# Patient Record
Sex: FEMALE | Race: WHITE | NOT HISPANIC OR LATINO | Employment: FULL TIME | URBAN - METROPOLITAN AREA
[De-identification: names, ages, dates, MRNs, and addresses within clinical notes are randomized per-mention and may not be internally consistent; named-entity substitution may affect disease eponyms.]

---

## 2021-09-16 ENCOUNTER — HOSPITAL ENCOUNTER (OUTPATIENT)
Facility: MEDICAL CENTER | Age: 31
End: 2021-09-16
Attending: OBSTETRICS & GYNECOLOGY
Payer: COMMERCIAL

## 2021-09-16 PROCEDURE — 87591 N.GONORRHOEAE DNA AMP PROB: CPT

## 2021-09-16 PROCEDURE — 87491 CHLMYD TRACH DNA AMP PROBE: CPT

## 2021-09-17 LAB
C TRACH DNA SPEC QL NAA+PROBE: NEGATIVE
N GONORRHOEA DNA SPEC QL NAA+PROBE: NEGATIVE
SPECIMEN SOURCE: NORMAL

## 2021-09-29 ENCOUNTER — HOSPITAL ENCOUNTER (OUTPATIENT)
Dept: LAB | Facility: MEDICAL CENTER | Age: 31
End: 2021-09-29
Attending: OBSTETRICS & GYNECOLOGY

## 2021-09-29 ENCOUNTER — HOSPITAL ENCOUNTER (OUTPATIENT)
Facility: MEDICAL CENTER | Age: 31
End: 2021-09-29
Attending: OBSTETRICS & GYNECOLOGY
Payer: COMMERCIAL

## 2021-09-29 LAB
ABO GROUP BLD: NORMAL
BASOPHILS # BLD AUTO: 0.3 % (ref 0–1.8)
BASOPHILS # BLD: 0.02 K/UL (ref 0–0.12)
BLD GP AB SCN SERPL QL: NORMAL
EOSINOPHIL # BLD AUTO: 0.05 K/UL (ref 0–0.51)
EOSINOPHIL NFR BLD: 0.7 % (ref 0–6.9)
ERYTHROCYTE [DISTWIDTH] IN BLOOD BY AUTOMATED COUNT: 43 FL (ref 35.9–50)
HBV SURFACE AG SER QL: NORMAL
HCT VFR BLD AUTO: 43.2 % (ref 37–47)
HCV AB SER QL: NORMAL
HGB BLD-MCNC: 15.6 G/DL (ref 12–16)
HIV 1+2 AB+HIV1 P24 AG SERPL QL IA: NORMAL
IMM GRANULOCYTES # BLD AUTO: 0.02 K/UL (ref 0–0.11)
IMM GRANULOCYTES NFR BLD AUTO: 0.3 % (ref 0–0.9)
LYMPHOCYTES # BLD AUTO: 1.81 K/UL (ref 1–4.8)
LYMPHOCYTES NFR BLD: 24.6 % (ref 22–41)
MCH RBC QN AUTO: 35.3 PG (ref 27–33)
MCHC RBC AUTO-ENTMCNC: 36.1 G/DL (ref 33.6–35)
MCV RBC AUTO: 97.7 FL (ref 81.4–97.8)
MONOCYTES # BLD AUTO: 0.54 K/UL (ref 0–0.85)
MONOCYTES NFR BLD AUTO: 7.3 % (ref 0–13.4)
NEUTROPHILS # BLD AUTO: 4.92 K/UL (ref 2–7.15)
NEUTROPHILS NFR BLD: 66.8 % (ref 44–72)
NRBC # BLD AUTO: 0 K/UL
NRBC BLD-RTO: 0 /100 WBC
PLATELET # BLD AUTO: 238 K/UL (ref 164–446)
PMV BLD AUTO: 10.9 FL (ref 9–12.9)
RBC # BLD AUTO: 4.42 M/UL (ref 4.2–5.4)
RH BLD: NORMAL
RUBV AB SER QL: 219 IU/ML
TREPONEMA PALLIDUM IGG+IGM AB [PRESENCE] IN SERUM OR PLASMA BY IMMUNOASSAY: NORMAL
WBC # BLD AUTO: 7.4 K/UL (ref 4.8–10.8)

## 2021-09-29 PROCEDURE — 85025 COMPLETE CBC W/AUTO DIFF WBC: CPT

## 2021-09-29 PROCEDURE — 87086 URINE CULTURE/COLONY COUNT: CPT

## 2021-09-29 PROCEDURE — 87389 HIV-1 AG W/HIV-1&-2 AB AG IA: CPT

## 2021-09-29 PROCEDURE — 86850 RBC ANTIBODY SCREEN: CPT

## 2021-09-29 PROCEDURE — 86900 BLOOD TYPING SEROLOGIC ABO: CPT

## 2021-09-29 PROCEDURE — 86803 HEPATITIS C AB TEST: CPT

## 2021-09-29 PROCEDURE — 87340 HEPATITIS B SURFACE AG IA: CPT

## 2021-09-29 PROCEDURE — 86780 TREPONEMA PALLIDUM: CPT

## 2021-09-29 PROCEDURE — 86762 RUBELLA ANTIBODY: CPT

## 2021-09-29 PROCEDURE — 36415 COLL VENOUS BLD VENIPUNCTURE: CPT

## 2021-09-29 PROCEDURE — 86901 BLOOD TYPING SEROLOGIC RH(D): CPT

## 2021-10-02 LAB
BACTERIA UR CULT: NORMAL
SIGNIFICANT IND 70042: NORMAL
SITE SITE: NORMAL
SOURCE SOURCE: NORMAL

## 2021-11-15 ENCOUNTER — HOSPITAL ENCOUNTER (OUTPATIENT)
Facility: MEDICAL CENTER | Age: 31
End: 2021-11-15
Attending: OBSTETRICS & GYNECOLOGY
Payer: COMMERCIAL

## 2021-11-15 ENCOUNTER — HOSPITAL ENCOUNTER (OUTPATIENT)
Dept: LAB | Facility: MEDICAL CENTER | Age: 31
End: 2021-11-15
Attending: OBSTETRICS & GYNECOLOGY
Payer: COMMERCIAL

## 2021-11-15 PROCEDURE — 82105 ALPHA-FETOPROTEIN SERUM: CPT

## 2021-11-15 PROCEDURE — 36415 COLL VENOUS BLD VENIPUNCTURE: CPT

## 2021-11-19 LAB
# FETUSES US: NORMAL
AFP MOM SERPL: 0.65
AFP SERPL-MCNC: 34 NG/ML
AGE - REPORTED: 32 YR
CURRENT SMOKER: NO
FAMILY MEMBER DISEASES HX: NO
GA METHOD: NORMAL
GA: NORMAL WK
IDDM PATIENT QL: NO
INTEGRATED SCN PATIENT-IMP: NORMAL
SPECIMEN DRAWN SERPL: NORMAL

## 2022-01-21 ENCOUNTER — HOSPITAL ENCOUNTER (OUTPATIENT)
Dept: LAB | Facility: MEDICAL CENTER | Age: 32
End: 2022-01-21
Attending: OBSTETRICS & GYNECOLOGY
Payer: COMMERCIAL

## 2022-01-21 LAB
BLD GP AB SCN SERPL QL: NORMAL
GLUCOSE 1H P 50 G GLC PO SERPL-MCNC: 167 MG/DL (ref 70–139)
HCT VFR BLD AUTO: 41.6 % (ref 37–47)
HGB BLD-MCNC: 14.3 G/DL (ref 12–16)
PLATELET # BLD AUTO: 226 K/UL (ref 164–446)
TREPONEMA PALLIDUM IGG+IGM AB [PRESENCE] IN SERUM OR PLASMA BY IMMUNOASSAY: NORMAL

## 2022-01-21 PROCEDURE — 86850 RBC ANTIBODY SCREEN: CPT

## 2022-01-21 PROCEDURE — 85014 HEMATOCRIT: CPT

## 2022-01-21 PROCEDURE — 86780 TREPONEMA PALLIDUM: CPT

## 2022-01-21 PROCEDURE — 85018 HEMOGLOBIN: CPT

## 2022-01-21 PROCEDURE — 36415 COLL VENOUS BLD VENIPUNCTURE: CPT

## 2022-01-21 PROCEDURE — 85049 AUTOMATED PLATELET COUNT: CPT

## 2022-01-21 PROCEDURE — 82950 GLUCOSE TEST: CPT

## 2022-02-02 ENCOUNTER — HOSPITAL ENCOUNTER (OUTPATIENT)
Dept: LAB | Facility: MEDICAL CENTER | Age: 32
End: 2022-02-02
Attending: OBSTETRICS & GYNECOLOGY

## 2022-02-02 ENCOUNTER — HOSPITAL ENCOUNTER (OUTPATIENT)
Facility: MEDICAL CENTER | Age: 32
End: 2022-02-02
Attending: OBSTETRICS & GYNECOLOGY
Payer: COMMERCIAL

## 2022-02-02 LAB
GLUCOSE 1H P CHAL SERPL-MCNC: 150 MG/DL (ref 65–180)
GLUCOSE 2H P CHAL SERPL-MCNC: 126 MG/DL (ref 65–155)
GLUCOSE 3H P CHAL SERPL-MCNC: 104 MG/DL (ref 65–140)
GLUCOSE BS SERPL-MCNC: 81 MG/DL (ref 65–95)

## 2022-02-02 PROCEDURE — 36415 COLL VENOUS BLD VENIPUNCTURE: CPT

## 2022-02-02 PROCEDURE — 82951 GLUCOSE TOLERANCE TEST (GTT): CPT

## 2022-02-02 PROCEDURE — 82952 GTT-ADDED SAMPLES: CPT

## 2022-02-05 ENCOUNTER — HOSPITAL ENCOUNTER (EMERGENCY)
Facility: MEDICAL CENTER | Age: 32
End: 2022-02-05
Attending: OBSTETRICS & GYNECOLOGY | Admitting: OBSTETRICS & GYNECOLOGY
Payer: COMMERCIAL

## 2022-02-05 VITALS
DIASTOLIC BLOOD PRESSURE: 67 MMHG | HEIGHT: 69 IN | SYSTOLIC BLOOD PRESSURE: 114 MMHG | WEIGHT: 155 LBS | HEART RATE: 88 BPM | TEMPERATURE: 96.5 F | BODY MASS INDEX: 22.96 KG/M2

## 2022-02-05 LAB
APPEARANCE UR: CLEAR
COLOR UR AUTO: YELLOW
FIBRONECTIN FETAL SPEC QL: NEGATIVE
GLUCOSE UR QL STRIP.AUTO: NEGATIVE MG/DL
KETONES UR QL STRIP.AUTO: NEGATIVE MG/DL
LEUKOCYTE ESTERASE UR QL STRIP.AUTO: ABNORMAL
NITRITE UR QL STRIP.AUTO: NEGATIVE
PH UR STRIP.AUTO: 7 [PH] (ref 5–8)
PROT UR QL STRIP: NEGATIVE MG/DL
RBC UR QL AUTO: NEGATIVE
SARS-COV+SARS-COV-2 AG RESP QL IA.RAPID: NOTDETECTED
SP GR UR STRIP.AUTO: 1.01 (ref 1–1.03)
SPECIMEN SOURCE: NORMAL

## 2022-02-05 PROCEDURE — A9270 NON-COVERED ITEM OR SERVICE: HCPCS | Performed by: OBSTETRICS & GYNECOLOGY

## 2022-02-05 PROCEDURE — 81002 URINALYSIS NONAUTO W/O SCOPE: CPT

## 2022-02-05 PROCEDURE — 82731 ASSAY OF FETAL FIBRONECTIN: CPT

## 2022-02-05 PROCEDURE — 99284 EMERGENCY DEPT VISIT MOD MDM: CPT

## 2022-02-05 PROCEDURE — 87426 SARSCOV CORONAVIRUS AG IA: CPT

## 2022-02-05 PROCEDURE — 700102 HCHG RX REV CODE 250 W/ 637 OVERRIDE(OP): Performed by: OBSTETRICS & GYNECOLOGY

## 2022-02-05 PROCEDURE — 59025 FETAL NON-STRESS TEST: CPT

## 2022-02-05 RX ORDER — ACETAMINOPHEN 500 MG
1000 TABLET ORAL ONCE
Status: COMPLETED | OUTPATIENT
Start: 2022-02-05 | End: 2022-02-05

## 2022-02-05 RX ADMIN — ACETAMINOPHEN 1000 MG: 500 TABLET ORAL at 20:15

## 2022-02-06 NOTE — PROGRESS NOTES
1800 - 32 y/o , EDC 22, EGA 30.3. Pt here for complaints of cramping and lower back pain. Pt states +FM, denies vaginal LOF/bleeding. Pt has a hx of a PTD at 27.6 weeks via classical  section.  Dr. Johnson aware of pt arrival. Orders to have US with vaginal probe at bedside. FFN at bedside. Pt aware we need a urine sample, pt unable to provide sample at this time. MD on way to bedside.    - Dr. Johnson at bedside for FFN and US.   - Covid swab sent per MD order.    - FFN and Covid swab negative. Dr. Johnson updated. Orders received to discharge pt home. Pt feels safe to discharge home.   All labor precautions gone over with pt. Pt to follow up with MD with scheduled appointment.

## 2022-02-06 NOTE — CONSULTS
DATE OF SERVICE:  2022     OBSTETRICS EMERGENCY DEPARTMENT NOTE     HISTORY OF PRESENT ILLNESS:  This 31-year-old lady is . Her LINDA is   2022 making her EGA 30 and 3/7th weeks.  She complains of generalized   low back pain and bilateral lower abdominal pain all day long.  Earlier today,   she thought it was constant, more recently the pain is intermittent.  She   states that it is not severe and she specifically denies any of the following,   nausea, vomiting, diarrhea, constipation, urinary symptoms, vaginal bleeding,   vaginal discharge, loss of fluid vaginally, trauma, muscle strains, fever,   chills, upper respiratory symptoms.  She reports good fetal movement.     OBSTETRIC HISTORY:  She has a previous 27 and 6/7th week  delivery by   classical  in [facility] 2019.  The pregnancy was complicated   by  labor,  rupture of membranes.     PRENATAL CARE:  She has been on Mia throughout this pregnancy because of   her history of previous  delivery.  She has had normal cervical lengths   and no signs or symptoms of  labor.     PHYSICAL EXAMINATION:  VITAL SIGNS:  Afebrile. All vital signs are within normal limits.  HEENT:  Normal.  LUNGS:  Clear to auscultation.  HEART:  Sounds normal.  ABDOMEN:  Nontender. No HSM.  No masses.  Fundal height is appropriate.    Uterus is soft and nontender.  EXTREMITIES:  No edema.  Homans' negative.  NEUROLOGIC:  DTRs normal.  BACK:  No CVA tenderness.  PELVIC:  Cervix is closed and thick.      Bedside ultrasound by me including   transvaginal ultrasound (30-3 weeks), appropriate for gestational age   fetus in cephalic presentation.  Estimated fetal weight 1728 grams, which is   61st percentile.  Amniotic fluid index 18.6 cm.  Biophysical profile easily   8/8. Anterior grade 0 placenta with no extra chorionic clot noted.  A   transvaginal ultrasound reveals a cervical length of 43 mm with no beaking and   a  double-curved cervical canal.  Monitoring reveals category 1 fetal heart   rate tracing with no decelerations, reactive nonstress test and no pattern of   uterine activity.     LABORATORY DATA:  Point of care urinalysis is unrevealing, vaginal fetal   fibronectin negative.  COVID swab negative.     DIAGNOSES:  1.  30 and 3/7th week gestation.  2.  Lower abdominal pain, etiology uncertain, no evidence of  labor,   premature rupture of membranes, placental abruption or uterine scar   separation.  3.  Previous classical  section.     PLAN:  Discharged home.  She will keep her next appointment with me. Tylenol   as needed for pain.  Continue weekly [place] injections.  Repeat    section has been scheduled.        ______________________________  MD JOE Toscano/ALESSANDRA    DD:  2022 02:57  DT:  2022 03:15    Job#:  [id number]

## 2022-02-06 NOTE — ED PROVIDER NOTES
OB ED NOTE dictated.    30 yo , LINDA 2022, EGA 30 3/7 wks    C/o low back and low abd pain all day, previously constant but now intermittent.  Denies: N/V/D/C, urinary sx, VB or DC, loss of fluid, trauma or muscle strains, fever/chills/URI sx.    OBHx: Prior 27 6/7 wk PTD by classical Csection in [facility] 2019, on Mia this pregnancy with normal cervical length and no S/S of PTL.      U/S and TVUS(30 3/7 wks): AGA fetus, cephalic presentation, 1728g/61st %ile, GAURAV 18.6 cm, BPP easily 8/8, anterior fundal grade 0 placenta, no EC clot,TVUS cervical length 43 mm, no beaking, double-curved cervical canal.    PE:  No distress  HEENT, lungs, heart, abd normal  No edema, DTR normal, Mo neg  Back: no CVAT  Digital exam: cervix closed and thick.    Monitor : FFR Cat I, reactive NST, no pattern of uterine activity.    LAB:     POC UA unrevealing  fFN  COVID Ag    DCd home undelivered, followup 1 week.

## 2022-02-21 ENCOUNTER — HOSPITAL ENCOUNTER (INPATIENT)
Facility: MEDICAL CENTER | Age: 32
LOS: 1 days | DRG: 833 | End: 2022-02-22
Attending: OBSTETRICS & GYNECOLOGY | Admitting: OBSTETRICS & GYNECOLOGY
Payer: COMMERCIAL

## 2022-02-21 PROBLEM — O46.93 THIRD TRIMESTER BLEEDING, ANTEPARTUM: Status: ACTIVE | Noted: 2022-02-21

## 2022-02-21 PROBLEM — R10.9 ABDOMINAL PAIN AFFECTING PREGNANCY, ANTEPARTUM: Status: ACTIVE | Noted: 2022-02-21

## 2022-02-21 PROBLEM — O09.893 HISTORY OF PRETERM DELIVERY, CURRENTLY PREGNANT IN THIRD TRIMESTER: Status: ACTIVE | Noted: 2022-02-21

## 2022-02-21 PROBLEM — Z67.91 RH NEGATIVE STATE IN ANTEPARTUM PERIOD, THIRD TRIMESTER: Status: ACTIVE | Noted: 2022-02-21

## 2022-02-21 PROBLEM — Z98.891 HISTORY OF CLASSICAL CESAREAN SECTION: Status: ACTIVE | Noted: 2022-02-21

## 2022-02-21 PROBLEM — O26.893 RH NEGATIVE STATE IN ANTEPARTUM PERIOD, THIRD TRIMESTER: Status: ACTIVE | Noted: 2022-02-21

## 2022-02-21 PROBLEM — O26.899 ABDOMINAL PAIN AFFECTING PREGNANCY, ANTEPARTUM: Status: ACTIVE | Noted: 2022-02-21

## 2022-02-21 LAB
ABO GROUP BLD: ABNORMAL
ACTION RH IMMUNE GLOB 8505RHG: NORMAL
ADULT RBCS COUNTED 8505ARB2: 4950 ADULT RBC
BARCODED ABORH UBTYP: 9500
BARCODED ABORH UBTYP: 9500
BARCODED PRD CODE UBPRD: ABNORMAL
BARCODED PRD CODE UBPRD: ABNORMAL
BARCODED UNIT NUM UBUNT: ABNORMAL
BARCODED UNIT NUM UBUNT: ABNORMAL
BASOPHILS # BLD AUTO: 0.4 % (ref 0–1.8)
BASOPHILS # BLD: 0.05 K/UL (ref 0–0.12)
BLD GP AB INVEST PLASRBC-IMP: ABNORMAL
BLD GP AB SCN SERPL QL: ABNORMAL
COMPONENT R 8504R: ABNORMAL
COMPONENT R 8504R: ABNORMAL
EOSINOPHIL # BLD AUTO: 0.15 K/UL (ref 0–0.51)
EOSINOPHIL NFR BLD: 1.2 % (ref 0–6.9)
ERYTHROCYTE [DISTWIDTH] IN BLOOD BY AUTOMATED COUNT: 47.1 FL (ref 35.9–50)
FETAL RBC PERCENT 8505FRBP: 0.06 %
FETAL STAIN FRBC 8505FRBC: 3 FETAL RBC
HCT VFR BLD AUTO: 41 % (ref 37–47)
HGB BLD-MCNC: 15 G/DL (ref 12–16)
IMM GRANULOCYTES # BLD AUTO: 0.16 K/UL (ref 0–0.11)
IMM GRANULOCYTES NFR BLD AUTO: 1.2 % (ref 0–0.9)
LYMPHOCYTES # BLD AUTO: 1.61 K/UL (ref 1–4.8)
LYMPHOCYTES NFR BLD: 12.4 % (ref 22–41)
MCH RBC QN AUTO: 35.9 PG (ref 27–33)
MCHC RBC AUTO-ENTMCNC: 36.6 G/DL (ref 33.6–35)
MCV RBC AUTO: 98.1 FL (ref 81.4–97.8)
MONOCYTES # BLD AUTO: 1.08 K/UL (ref 0–0.85)
MONOCYTES NFR BLD AUTO: 8.3 % (ref 0–13.4)
NEUTROPHILS # BLD AUTO: 9.9 K/UL (ref 2–7.15)
NEUTROPHILS NFR BLD: 76.5 % (ref 44–72)
NRBC # BLD AUTO: 0 K/UL
NRBC BLD-RTO: 0 /100 WBC
NUMBER OF RH DOSES IND 8505RD: 2
NUMBER OF RH DOSES IND 8505RD: 2 # RHIG
PLATELET # BLD AUTO: 198 K/UL (ref 164–446)
PMV BLD AUTO: 10.3 FL (ref 9–12.9)
PRODUCT TYPE UPROD: ABNORMAL
PRODUCT TYPE UPROD: ABNORMAL
RBC # BLD AUTO: 4.18 M/UL (ref 4.2–5.4)
RH BLD: ABNORMAL
SARS-COV+SARS-COV-2 AG RESP QL IA.RAPID: NOTDETECTED
SPECIMEN SOURCE: NORMAL
UNIT STATUS USTAT: ABNORMAL
UNIT STATUS USTAT: ABNORMAL
WBC # BLD AUTO: 13 K/UL (ref 4.8–10.8)
WEAK D AG RBC QL: NORMAL
XXX BLOOD GROUP AB TITR SERPL AHG: 8 {TITER}

## 2022-02-21 PROCEDURE — 59025 FETAL NON-STRESS TEST: CPT

## 2022-02-21 PROCEDURE — 85025 COMPLETE CBC W/AUTO DIFF WBC: CPT

## 2022-02-21 PROCEDURE — 87426 SARSCOV CORONAVIRUS AG IA: CPT

## 2022-02-21 PROCEDURE — 302790 HCHG STAT ANTEPARTUM CARE, DAILY

## 2022-02-21 PROCEDURE — A9270 NON-COVERED ITEM OR SERVICE: HCPCS | Performed by: OBSTETRICS & GYNECOLOGY

## 2022-02-21 PROCEDURE — 85460 HEMOGLOBIN FETAL: CPT

## 2022-02-21 PROCEDURE — 87150 DNA/RNA AMPLIFIED PROBE: CPT

## 2022-02-21 PROCEDURE — 700105 HCHG RX REV CODE 258: Performed by: OBSTETRICS & GYNECOLOGY

## 2022-02-21 PROCEDURE — 3E0234Z INTRODUCTION OF SERUM, TOXOID AND VACCINE INTO MUSCLE, PERCUTANEOUS APPROACH: ICD-10-PCS | Performed by: OBSTETRICS & GYNECOLOGY

## 2022-02-21 PROCEDURE — 700102 HCHG RX REV CODE 250 W/ 637 OVERRIDE(OP): Performed by: OBSTETRICS & GYNECOLOGY

## 2022-02-21 PROCEDURE — 36415 COLL VENOUS BLD VENIPUNCTURE: CPT

## 2022-02-21 PROCEDURE — 86850 RBC ANTIBODY SCREEN: CPT

## 2022-02-21 PROCEDURE — 86886 COOMBS TEST INDIRECT TITER: CPT

## 2022-02-21 PROCEDURE — 87081 CULTURE SCREEN ONLY: CPT

## 2022-02-21 PROCEDURE — 86900 BLOOD TYPING SEROLOGIC ABO: CPT

## 2022-02-21 PROCEDURE — 99285 EMERGENCY DEPT VISIT HI MDM: CPT

## 2022-02-21 PROCEDURE — 96372 THER/PROPH/DIAG INJ SC/IM: CPT

## 2022-02-21 PROCEDURE — 770002 HCHG ROOM/CARE - OB PRIVATE (112)

## 2022-02-21 PROCEDURE — 700111 HCHG RX REV CODE 636 W/ 250 OVERRIDE (IP): Performed by: OBSTETRICS & GYNECOLOGY

## 2022-02-21 PROCEDURE — 86922 COMPATIBILITY TEST ANTIGLOB: CPT | Mod: 91

## 2022-02-21 PROCEDURE — 86870 RBC ANTIBODY IDENTIFICATION: CPT

## 2022-02-21 PROCEDURE — 86901 BLOOD TYPING SEROLOGIC RH(D): CPT | Mod: 91

## 2022-02-21 RX ORDER — DOCUSATE SODIUM 100 MG/1
100 CAPSULE, LIQUID FILLED ORAL 2 TIMES DAILY
Status: DISCONTINUED | OUTPATIENT
Start: 2022-02-21 | End: 2022-02-22 | Stop reason: HOSPADM

## 2022-02-21 RX ORDER — BETAMETHASONE SODIUM PHOSPHATE AND BETAMETHASONE ACETATE 3; 3 MG/ML; MG/ML
12 INJECTION, SUSPENSION INTRA-ARTICULAR; INTRALESIONAL; INTRAMUSCULAR; SOFT TISSUE EVERY 24 HOURS
Status: COMPLETED | OUTPATIENT
Start: 2022-02-21 | End: 2022-02-22

## 2022-02-21 RX ORDER — SODIUM CHLORIDE, SODIUM LACTATE, POTASSIUM CHLORIDE, CALCIUM CHLORIDE 600; 310; 30; 20 MG/100ML; MG/100ML; MG/100ML; MG/100ML
INJECTION, SOLUTION INTRAVENOUS CONTINUOUS
Status: DISCONTINUED | OUTPATIENT
Start: 2022-02-21 | End: 2022-02-21

## 2022-02-21 RX ORDER — CALCIUM CARBONATE 500 MG/1
500 TABLET, CHEWABLE ORAL PRN
Status: DISCONTINUED | OUTPATIENT
Start: 2022-02-21 | End: 2022-02-22 | Stop reason: HOSPADM

## 2022-02-21 RX ORDER — BETAMETHASONE SODIUM PHOSPHATE AND BETAMETHASONE ACETATE 3; 3 MG/ML; MG/ML
12 INJECTION, SUSPENSION INTRA-ARTICULAR; INTRALESIONAL; INTRAMUSCULAR; SOFT TISSUE EVERY 24 HOURS
Status: DISCONTINUED | OUTPATIENT
Start: 2022-02-21 | End: 2022-02-21

## 2022-02-21 RX ORDER — FAMOTIDINE 20 MG/1
20 TABLET, FILM COATED ORAL 2 TIMES DAILY PRN
Status: DISCONTINUED | OUTPATIENT
Start: 2022-02-21 | End: 2022-02-22 | Stop reason: HOSPADM

## 2022-02-21 RX ORDER — VITAMIN A ACETATE, BETA CAROTENE, ASCORBIC ACID, CHOLECALCIFEROL, .ALPHA.-TOCOPHEROL ACETATE, DL-, THIAMINE MONONITRATE, RIBOFLAVIN, NIACINAMIDE, PYRIDOXINE HYDROCHLORIDE, FOLIC ACID, CYANOCOBALAMIN, CALCIUM CARBONATE, FERROUS FUMARATE, ZINC OXIDE, CUPRIC OXIDE 3080; 12; 120; 400; 1; 1.84; 3; 20; 22; 920; 25; 200; 27; 10; 2 [IU]/1; UG/1; MG/1; [IU]/1; MG/1; MG/1; MG/1; MG/1; MG/1; [IU]/1; MG/1; MG/1; MG/1; MG/1; MG/1
1 TABLET, FILM COATED ORAL
Status: DISCONTINUED | OUTPATIENT
Start: 2022-02-21 | End: 2022-02-22 | Stop reason: HOSPADM

## 2022-02-21 RX ADMIN — PRENATAL WITH FERROUS FUM AND FOLIC ACID 1 TABLET: 3080; 920; 120; 400; 22; 1.84; 3; 20; 10; 1; 12; 200; 27; 25; 2 TABLET ORAL at 08:49

## 2022-02-21 RX ADMIN — BETAMETHASONE SODIUM PHOSPHATE AND BETAMETHASONE ACETATE 12 MG: 3; 3 INJECTION, SUSPENSION INTRA-ARTICULAR; INTRALESIONAL; INTRAMUSCULAR at 05:32

## 2022-02-21 RX ADMIN — DOCUSATE SODIUM 100 MG: 100 CAPSULE, LIQUID FILLED ORAL at 08:49

## 2022-02-21 RX ADMIN — FAMOTIDINE 20 MG: 20 TABLET ORAL at 17:16

## 2022-02-21 RX ADMIN — SODIUM CHLORIDE, POTASSIUM CHLORIDE, SODIUM LACTATE AND CALCIUM CHLORIDE: 600; 310; 30; 20 INJECTION, SOLUTION INTRAVENOUS at 05:54

## 2022-02-21 ASSESSMENT — PATIENT HEALTH QUESTIONNAIRE - PHQ9
SUM OF ALL RESPONSES TO PHQ9 QUESTIONS 1 AND 2: 0
1. LITTLE INTEREST OR PLEASURE IN DOING THINGS: NOT AT ALL
2. FEELING DOWN, DEPRESSED, IRRITABLE, OR HOPELESS: NOT AT ALL

## 2022-02-21 ASSESSMENT — PAIN DESCRIPTION - PAIN TYPE: TYPE: ACUTE PAIN

## 2022-02-21 NOTE — PROGRESS NOTES
[facility] -  EGA - 32-5    0440 - Pt arrived to labor and delivery for vaginal bleeding with lower abdominal cramping/pain. Pt placed in room LDA6.  0449 - External monitors in place X2. Category I FHT at this time. VSS. Pt with history of classical incision over uterus during previous  2 years prior. Pt states she started having lower abdominal pain around 9pm last night. She then fell asleep and woke around 3am and notice bleeding where she then came into the hospital. FOB at bedside. Dr Johnson at bedside for ultrasound. Orders placed. Pt stable at this time. To be transferred for observation and steroids. SVE performed by Dr Johnson.   0530 - Report given. POC discussed.

## 2022-02-21 NOTE — PROGRESS NOTES
0545 Report received from Queenie LENZ, patient transferred to antepartum, FHR shows moderate variability with accels, no decels, patient denies further bleeding and abdominal pain at this time; will continue to monitor FHR, labs pending, and update MD    0700 Bedside report given to Jeni

## 2022-02-21 NOTE — H&P
DATE OF ADMISSION:  2022     CHIEF COMPLAINT:  Abdominal pain and vaginal bleeding at 32 and 5/7th weeks'   gestation, previous classical .     HISTORY OF PRESENT ILLNESS:  The patient is a 31-year-old lady,  2,   para 1 with an LINDA of 2022 making her EGA 32 and 5/7th weeks.  She has a   history of  labor and  classical  (anchor-shaped hysterotomy) in [facility] in   2019, and has been monitored closely this pregnancy. She's had no evidence of    labor, she's had normal cervical lengths, and has been getting  weekly prophylactic    Bajadero injections since 16 weeks' gestation.      She complains of intermittent bilateral lower abdominal pain since 2100 last night   (8 hours ago), 6/10 at worst.  She complains of   unprovoked red vaginal bleeding into her toilet water at 0300 today, 2.5 hours   ago.  I asked her to come promptly, and she did.  She is no longer having any red   bleeding.  There is some dark brown blood in her vagina.  Fetal monitoring is   reassuring.  She does not appear to be in labor.  Physical exam reveals   bilateral lower abdominal tenderness.  She has been admitted for continuous   monitoring and close observation to rule out placental abruption and uterine   scar separation.  Labs are pending.  As she has not received betamethasone yet,   we have   started that process.     Prenatal care as above.  Blood type is O negative.  She did receive antepartum   RhoGAM in the office.  Cell-free fetal DNA reassuring. MSAFP reassuring. She has been on Bajadero injections since 16 weeks gestation to reduce her risk of recurrent  delivery.  She has had   normal cervical lengths on  serial ultrasound exams.  Ultrasounds have shown   normal fetal anatomy and growth, anterior placenta with no signs of myometrial invasion.  Three-hour glucose tolerance test was within normal limits.  She has had no evidence of  labor or  "preeclampsia.  She reports that she had a negative global recessive gene screen during her prior pregnancy, while her  was found to be a carrier for \"several Religion conditions.\"    OBSTETRICAL HISTORY:  1.  Primary classical  (anchor-shaped uterine incision per op-note, very difficult fetal extraction) in [place] 2019 at 27 and 6/7th weeks, after a   pregnancy complicated by  labor at 25 weeks.  She was discharged   undelivered, readmitted with  rupture of membranes and   recurrent labor.  The baby was breech.  She had a 1080 gram female infant.  2.  Present pregnancy.     PAST MEDICAL HISTORY:  Positive for idiopathic  labor, followed by a    classical .     ALLERGIES:  No known drug allergies.     PAST SURGICAL HISTORY:  Classical  with anchor-shaped hysterotomy in 2019.     SOCIAL HISTORY:  She is  to Humphrey.  She denies alcohol, tobacco or drug   consumption.  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     FAMILY HISTORY:  Positive for mother with chronic hypertension.  Father and   paternal grandfather with heart disease.  Paternal uncle, prostate cancer.     PHYSICAL EXAMINATION:  VITAL SIGNS:  Temp 98.6, pulse 89, respiration 18, /73.  HEENT:  Normal.  LUNGS:  Clear to auscultation.  HEART:  Sounds normal.  ABDOMEN:  Fundal height is appropriate.  No hepatosplenomegaly.  Her fundus is   nontender.  She does have significant bilateral lower abdominal tenderness,   without rebound pain.  BACK:  No CVA tenderness.  EXTREMITIES:  No edema.  Homans' negative.  NEUROLOGIC:  DTRs normal.  PELVIC:  Cervix is closed, at least 2 cm long.  There was some dark red blood   on my glove after the exam.      Monitor:  reactive nonstress test, no decelerations, no pattern of uterine activity.      Bedside ultrasound: cephalic presentation, normal amniotic fluid volume (maximum vertical pocket 6.9 cm),   anterior " fundal grade I placenta with no sonographic evidence of extra   chorionic clot.  Biophysical profile 10/10.  Umbilical artery S/D ratio 2.62.    I did not calculate an estimated fetal weight today ,  as it would not affect management.     LABORATORY DATA:  Pending.     DIAGNOSES:  1.  32 and 5/7th week gestation.  2.  Previous classical , anchor-shaped hysterotomy.  3.  History of  delivery in 2019, not in labor, on prophylactic Mia.  4.  Vaginal bleeding and abdominal pain, rule out placental abruption and         uterine scar separation.  5.  Rh negative, nonsensitized, status post RhoGAM in office.  6.  Unknown group B strep status.     PLAN:    Continuous monitoring.    We will check CBC, Kleihauer-Betke and obtain cross match on demand.    IV access has been obtained.    We will keep her n.p.o. except for ice chips, pending further investigation.    Betamethasone 12 mg IM, 2 doses 24 hours apart, discussed rationale.  Group B strep was sent.    If she has any heavy red bleeding, severe relentless pain or fetal compromise, deliver by repeat  section.   If delivery is planned, we will start magnesium sulfate for neuro protection.  If pregnancy is prolonged continue Mia injections every Wednesday.     ADDENDUM:     FHR Category I, only sporadic asymptomatic contractions on monitor, no new red bleeding.  K-B shows 3 fetal RBCs/4950 maternal RBCs. This could be normal physiology or low-grade abruption.   She had RhoGam on office on 2022, 4+ weeks ago.    She likely doesn't need more RhoGam, but to err on the side of caution I'll order one more dose.  Will DC continuous monitoring (resume if any red bleeding or significant pain), NST Q12H.     2022 05:03 2022 05:15   WBC  13.0 (H)   Hemoglobin  15.0   Hematocrit  41.0   Platelets  198   ABO   O   Rh  NEG   Antibody Screen  POS (A)   Fetal Stain - FRBC 3    Adult RBCs Counted 4950    Fetal RBC Percent 0.06    Number Of Rh  Doses Indicated 2 2           ______________________________  MD JOE Toscano/MITALI    DD:  02/21/2022 05:41  DT:  02/21/2022 06:47    Job#:  [id number]

## 2022-02-21 NOTE — ED PROVIDER NOTES
"H and P dictated    32 yo , LINDA 2022, EGA 32 5/7 wks    C/o intermittent bilat lower abd pain since 21:00 (8 hrs) \"6/10 at worst\".  C/o unprovoked red blood into toilet water 03:00 today.  Hx classical Csection in [place] 2019 at 27 6/7 wks  Hx PTL at 25 wks in 2019, no PTL and normal cervical lengths this pregnancy on Mia since 16 weeks.     Unknown GBS-sent  Hasn't had betamethasone yet this pregnancy-started now    PE    VS  37 °C (98.6 °F) 89 18 108/73     Lower abd tender  Cervix closed and at least 2 cm long, dark red blood on glove    Monitor:  NST reactive, no pattern of uterine activity    Bedside U/S: cephalic pres, normal AFV (MVP 6.9 cm), anterior fundal grade I placenta, no EC clot, UA S/D 2.62, BPP 10/10, EFW not done as it wouldn't affect management    DX:    32 5/7 wks  Prior classical Csection  Vaginal bleeding -r/o low-grade abruption, r/o uterine scar separation  Abdominal pain  Hx PTD in 2019, not in labor now, on Mia  Unknown GBS  Rh-neg, s/p RhoGam in office    PLAN:      Continuous monitoring.  Check CBC, K-B  NPO except ice chips for now.  IV access.  Betamethasone.  GBS sent.  If any heavy vaginal bleeding, severe relentless pain, or fetal compromise, deliver by LTCS.  If delivery planned, MgSO4 for neuroprotection.              "

## 2022-02-21 NOTE — CARE PLAN
The patient is Stable - Low risk of patient condition declining or worsening    Shift Goals  Clinical Goals: no bleeding or contractions  Patient Goals: stay pregnant  Family Goals: support patient while in hospital    Progress made toward(s) clinical / shift goals:  stay pregnant    Patient is not progressing towards the following goals:

## 2022-02-21 NOTE — PROGRESS NOTES
0700: report received from KAY Montes. Pt. Stable at this time. Scant amount of dark red blood on pt. Pad. Pt. Reports no increased pain or pressure at this time.     0800: updated orders received from Dr. Johnson. Pt. In agreement with POC. Denies further questions or concerns at this time.     0850:  consent signed by patient and witnessed. MD reviewed risks and benefits. Pt. In agreement with POC.     1715: pt. Reports complaints of increased pain with urination. States more cramping, but not burning. Relief approximately 30 seconds after. Dr. Beck notified. Pt. [place] placed and urine dipstick ordered. Pt. Denies VB or fluid leaking. Pt. In agreement with POC.    1900: report given to KAY Montes. Pt. Care transferred. Pt. Stable.

## 2022-02-22 VITALS
BODY MASS INDEX: 22.96 KG/M2 | RESPIRATION RATE: 16 BRPM | OXYGEN SATURATION: 96 % | HEIGHT: 69 IN | HEART RATE: 103 BPM | DIASTOLIC BLOOD PRESSURE: 72 MMHG | SYSTOLIC BLOOD PRESSURE: 103 MMHG | TEMPERATURE: 97 F | WEIGHT: 155 LBS

## 2022-02-22 LAB — GP B STREP DNA SPEC QL NAA+PROBE: NEGATIVE

## 2022-02-22 PROCEDURE — 59025 FETAL NON-STRESS TEST: CPT

## 2022-02-22 PROCEDURE — A9270 NON-COVERED ITEM OR SERVICE: HCPCS | Performed by: OBSTETRICS & GYNECOLOGY

## 2022-02-22 PROCEDURE — 700111 HCHG RX REV CODE 636 W/ 250 OVERRIDE (IP): Performed by: OBSTETRICS & GYNECOLOGY

## 2022-02-22 PROCEDURE — 700102 HCHG RX REV CODE 250 W/ 637 OVERRIDE(OP): Performed by: OBSTETRICS & GYNECOLOGY

## 2022-02-22 RX ORDER — VITAMIN A ACETATE, BETA CAROTENE, ASCORBIC ACID, CHOLECALCIFEROL, .ALPHA.-TOCOPHEROL ACETATE, DL-, THIAMINE MONONITRATE, RIBOFLAVIN, NIACINAMIDE, PYRIDOXINE HYDROCHLORIDE, FOLIC ACID, CYANOCOBALAMIN, CALCIUM CARBONATE, FERROUS FUMARATE, ZINC OXIDE, CUPRIC OXIDE 3080; 12; 120; 400; 1; 1.84; 3; 20; 22; 920; 25; 200; 27; 10; 2 [IU]/1; UG/1; MG/1; [IU]/1; MG/1; MG/1; MG/1; MG/1; MG/1; [IU]/1; MG/1; MG/1; MG/1; MG/1; MG/1
1 TABLET, FILM COATED ORAL DAILY
Qty: 30 TABLET | Status: SHIPPED
Start: 2022-02-22

## 2022-02-22 RX ADMIN — DOCUSATE SODIUM 100 MG: 100 CAPSULE, LIQUID FILLED ORAL at 04:51

## 2022-02-22 RX ADMIN — BETAMETHASONE SODIUM PHOSPHATE AND BETAMETHASONE ACETATE 12 MG: 3; 3 INJECTION, SUSPENSION INTRA-ARTICULAR; INTRALESIONAL; INTRAMUSCULAR at 04:51

## 2022-02-22 RX ADMIN — PRENATAL WITH FERROUS FUM AND FOLIC ACID 1 TABLET: 3080; 920; 120; 400; 22; 1.84; 3; 20; 10; 1; 12; 200; 27; 25; 2 TABLET ORAL at 09:11

## 2022-02-22 ASSESSMENT — PATIENT HEALTH QUESTIONNAIRE - PHQ9
2. FEELING DOWN, DEPRESSED, IRRITABLE, OR HOPELESS: NOT AT ALL
SUM OF ALL RESPONSES TO PHQ9 QUESTIONS 1 AND 2: 0
1. LITTLE INTEREST OR PLEASURE IN DOING THINGS: NOT AT ALL

## 2022-02-22 NOTE — DISCHARGE INSTRUCTIONS
Labor and Birth Information  Pregnancy normally lasts 39-41 weeks.  labor is when labor starts early. It starts before you have been pregnant for 37 whole weeks.  What are the risk factors for  labor?   labor is more likely to occur in women who:  · Have an infection while pregnant.  · Have a cervix that is short.  · Have gone into  labor before.  · Have had surgery on their cervix.  · Are younger than age 17.  · Are older than age 35.  · Are .  · Are pregnant with two or more babies.  · Take street drugs while pregnant.  · Smoke while pregnant.  · Do not gain enough weight while pregnant.  · Got pregnant right after another pregnancy.  What are the symptoms of  labor?  Symptoms of  labor include:  · Cramps. The cramps may feel like the cramps some women get during their period. The cramps may happen with watery poop (diarrhea).  · Pain in the belly (abdomen).  · Pain in the lower back.  · Regular contractions or tightening. It may feel like your belly is getting tighter.  · Pressure in the lower belly that seems to get stronger.  · More fluid (discharge) leaking from the vagina. The fluid may be watery or bloody.  · Water breaking.  Why is it important to notice signs of  labor?  Babies who are born early may not be fully developed. They have a higher chance for:  · Long-term heart problems.  · Long-term lung problems.  · Trouble controlling body systems, like breathing.  · Bleeding in the brain.  · A condition called cerebral palsy.  · Learning difficulties.  · Death.  These risks are highest for babies who are born before 34 weeks of pregnancy.  How is  labor treated?  Treatment depends on:  · How long you were pregnant.  · Your condition.  · The health of your baby.  Treatment may involve:  · Having a stitch (suture) placed in your cervix. When you give birth, your cervix opens so the baby can come out. The stitch keeps the cervix  from opening too soon.  · Staying at the hospital.  · Taking or getting medicines, such as:  ? Hormone medicines.  ? Medicines to stop contractions.  ? Medicines to help the baby’s lungs develop.  ? Medicines to prevent your baby from having cerebral palsy.  What should I do if I am in  labor?  If you think you are going into labor too soon, call your doctor right away.  How can I prevent  labor?  · Do not use any tobacco products.  ? Examples of these are cigarettes, chewing tobacco, and e-cigarettes.  ? If you need help quitting, ask your doctor.  · Do not use street drugs.  · Do not use any medicines unless you ask your doctor if they are safe for you.  · Talk with your doctor before taking any herbal supplements.  · Make sure you gain enough weight.  · Watch for infection. If you think you might have an infection, get it checked right away.  · If you have gone into  labor before, tell your doctor.  This information is not intended to replace advice given to you by your health care provider. Make sure you discuss any questions you have with your health care provider.  Document Released: 2010 Document Revised: 04/10/2020 Document Reviewed: 05/10/2017  [employer] Patient Education ©  [employer]  Pre-term Labor (<37 weeks):  Call your physician or return to the hospital if:  · You have painless regular contractions more than 4 in one hour.  · Your water breaks (remember time and color).  · You have menstrual-like cramps, a low dull backache or pressure in your pelvis or back.  · Your baby does not move enough to complete the daily kick count (10 movements in 2 hours).  · Your baby moves much less often than on the days before or you have not felt your baby move all day.  · Please review the MEDICATION LIST section of your AFTER VISIT SUMMARY document.  · Take your medication as prescribed

## 2022-02-22 NOTE — PROGRESS NOTES
0700: report received from KAY Montes. Pt. Stable.     0900: Dr. Johnson at bedside. Discharge orders placed. Vitals WNL. Category I tracing. Pt. Denies VB or fluid leaking. No ctx.     1010: AVS given to patient. Pt. Educated on signs/symptoms of  labor. Pt. In agreement to follow up at scheduled appointment in office and remain adequately hydrated. Pt. In agreement with POC. Pt. Denies further questions or concerns at this time. Pt. Discharged.

## 2022-02-22 NOTE — DISCHARGE SUMMARY
DATE OF ADMISSION:  2022   DATE OF DISCHARGE:  2022     ADMISSION DIAGNOSES:  1.  A 32 and 5/7th weeks' gestation.  2.  Previous classical  with anchor-shaped hysterotomy.  3.  History of  delivery, .  4.  Vaginal bleeding and abdominal pain.  5.  Rh negative, nonsensitized.     DISCHARGE DIAGNOSES:  1.  A 32 and 6/7th weeks' gestation, ongoing.  2.  Previous classical  with anchor-shaped hysterotomy.  3.  History of  delivery, 2019.  4.  Vaginal bleeding and abdominal pain, resolved.  5.  Rh negative, nonsensitized.     PROCEDURES:  None.     PRESCRIPTIONS:  Prenatal vitamin daily.     FOLLOWUP PLANS: One week.     HOSPITAL COURSE:  This 31-year-old lady is G2, P1. Her LINDA is 2022.  She   phoned me at 32 and 5/7th weeks' gestation complaining of some red blood   dripping into the toilet, and 8 hours of moderate abdominal pain.  Since she   has a history of  delivery and classical  in the past, I asked   her to come promptly for evaluation.  By the time she arrived at the   hospital, she was no longer having any red bleeding, just a little bit of dark   brown blood in her vagina.  Her abdominal pain had improved.  Physical exam   was unrevealing.  A bedside ultrasound showed cephalic presentation, normal   amniotic fluid volume.  Anterior fundal placenta with no evidence of extra   chorionic clot.  No intra-abdominal blood.  Biophysical profile was 10/10.    Umbilical artery ratio S/D was 2.62.  Fetal monitoring was perfectly   reassuring.  There was no evidence of labor.  Her cervix was closed and thick   on exam with no red blood present.     She received a complete course of betamethasone 12 mg IM 2 doses 24 hours   apart.  She was monitored in the hospital for over 24 hours.  Fetal monitoring   was persistently reassuring.  There was never any evidence of labor.  On the   morning of discharge, her symptoms have completely resolved.  She has had  "no   red bleeding whatsoever in the hospital.  She denies abdominal pain.  Her   uterus is soft and nontender.     She did receive 1 dose of RhoGAM in the hospital.  Kleihauer-Betke test showed   3 fetal cells per 4950 maternal cells.  She had already received RhoGAM in   the office 4-1/2 weeks earlier, but I did give another dose during this   hospitalization to \"err on the side of caution.\"     At this point, I am not finding any evidence of uterine scar dehiscence or   placental abruption.  We did review current ACOG guidelines on timing of   repeat  section in the presence of a previous classical .    She actually had an anchor-shaped hysterotomy during her previous ,   which worries me a little more than a straightforward classical .  We   did decide to do her repeat  at 36.5 weeks gestation to reduce her   risk of catastrophic uterine rupture.  Followup is planned in the office in   one week.  She knows that she should return to labor and delivery if she has   any recurrent red bleeding or severe abdominal pain.        ______________________________  MD JOE Toscano/ANUPAM/STONE    DD:  2022 12:03  DT:  2022 14:56    Job#:  [id number]    "

## 2022-02-22 NOTE — PROGRESS NOTES
1900- Received report from KAY Ngo. Assumed care of pt. Pt denies needs at this time.    2016- Pt assessed. WDL. Pt denies UC's, cramping, vaginal bleeding, LOF, states + fetal movement. EFM and TOCO applied. POC discussed- will cluster to maximize pt sleep tonight. Pt encouraged to call with needs. Will monitor.

## 2022-02-22 NOTE — PROGRESS NOTES
32 6/7 wks    Afebrile, VA normal  No more bleeding.  Good FM.  NST reactive.  S/p betamethasone #2 at 05:00    PE:  Uterus nontender    DC home  F/u 1 week.  Will move RCS up to 36.5-37 weeks to reduce the risk of catastrophic uterine rupture from her anchor-shaped incision, pt. Agrees.

## 2022-03-09 ENCOUNTER — PRE-ADMISSION TESTING (OUTPATIENT)
Dept: ADMISSIONS | Facility: MEDICAL CENTER | Age: 32
End: 2022-03-09
Attending: OBSTETRICS & GYNECOLOGY
Payer: COMMERCIAL

## 2022-03-14 ENCOUNTER — ANESTHESIA EVENT (OUTPATIENT)
Dept: OBGYN | Facility: MEDICAL CENTER | Age: 32
End: 2022-03-14
Payer: COMMERCIAL

## 2022-03-14 ENCOUNTER — HOSPITAL ENCOUNTER (INPATIENT)
Facility: MEDICAL CENTER | Age: 32
LOS: 4 days | End: 2022-03-18
Attending: OBSTETRICS & GYNECOLOGY | Admitting: OBSTETRICS & GYNECOLOGY
Payer: COMMERCIAL

## 2022-03-14 ENCOUNTER — ANESTHESIA (OUTPATIENT)
Dept: OBGYN | Facility: MEDICAL CENTER | Age: 32
End: 2022-03-14
Payer: COMMERCIAL

## 2022-03-14 DIAGNOSIS — G89.18 POSTOPERATIVE PAIN: ICD-10-CM

## 2022-03-14 LAB
ACTION RH IMMUNE GLOB 8505RHG: NORMAL
ADULT RBCS COUNTED 8505ARB2: 4950 ADULT RBC
BASOPHILS # BLD AUTO: 0.3 % (ref 0–1.8)
BASOPHILS # BLD: 0.04 K/UL (ref 0–0.12)
EOSINOPHIL # BLD AUTO: 0.03 K/UL (ref 0–0.51)
EOSINOPHIL NFR BLD: 0.2 % (ref 0–6.9)
ERYTHROCYTE [DISTWIDTH] IN BLOOD BY AUTOMATED COUNT: 49 FL (ref 35.9–50)
ERYTHROCYTE [DISTWIDTH] IN BLOOD BY AUTOMATED COUNT: 49.6 FL (ref 35.9–50)
FETAL RBC PERCENT 8505FRBP: 0.04 %
FETAL STAIN FRBC 8505FRBC: 2 FETAL RBC
HCT VFR BLD AUTO: 36 % (ref 37–47)
HCT VFR BLD AUTO: 43.7 % (ref 37–47)
HGB BLD-MCNC: 12.5 G/DL (ref 12–16)
HGB BLD-MCNC: 15.5 G/DL (ref 12–16)
HOLDING TUBE BB 8507: NORMAL
IMM GRANULOCYTES # BLD AUTO: 0.11 K/UL (ref 0–0.11)
IMM GRANULOCYTES NFR BLD AUTO: 0.9 % (ref 0–0.9)
IMMUNE ROSETTING TEST 8505FMH: NORMAL
LYMPHOCYTES # BLD AUTO: 1.55 K/UL (ref 1–4.8)
LYMPHOCYTES NFR BLD: 12 % (ref 22–41)
MCH RBC QN AUTO: 34.9 PG (ref 27–33)
MCH RBC QN AUTO: 35.9 PG (ref 27–33)
MCHC RBC AUTO-ENTMCNC: 34.7 G/DL (ref 33.6–35)
MCHC RBC AUTO-ENTMCNC: 35.5 G/DL (ref 33.6–35)
MCV RBC AUTO: 100.6 FL (ref 81.4–97.8)
MCV RBC AUTO: 101.2 FL (ref 81.4–97.8)
MONOCYTES # BLD AUTO: 0.74 K/UL (ref 0–0.85)
MONOCYTES NFR BLD AUTO: 5.7 % (ref 0–13.4)
NEUTROPHILS # BLD AUTO: 10.47 K/UL (ref 2–7.15)
NEUTROPHILS NFR BLD: 80.9 % (ref 44–72)
NRBC # BLD AUTO: 0 K/UL
NRBC BLD-RTO: 0 /100 WBC
NUMBER OF RH DOSES IND 8505RD: 1
NUMBER OF RH DOSES IND 8505RD: 2
NUMBER OF RH DOSES IND 8505RD: 2 # RHIG
PLATELET # BLD AUTO: 187 K/UL (ref 164–446)
PLATELET # BLD AUTO: 201 K/UL (ref 164–446)
PMV BLD AUTO: 10.3 FL (ref 9–12.9)
PMV BLD AUTO: 10.4 FL (ref 9–12.9)
RBC # BLD AUTO: 3.58 M/UL (ref 4.2–5.4)
RBC # BLD AUTO: 4.32 M/UL (ref 4.2–5.4)
SARS-COV+SARS-COV-2 AG RESP QL IA.RAPID: NOTDETECTED
SPECIMEN SOURCE: NORMAL
WBC # BLD AUTO: 10.3 K/UL (ref 4.8–10.8)
WBC # BLD AUTO: 12.9 K/UL (ref 4.8–10.8)
WEAK D AG RBC QL: NORMAL

## 2022-03-14 PROCEDURE — 700111 HCHG RX REV CODE 636 W/ 250 OVERRIDE (IP): Performed by: OBSTETRICS & GYNECOLOGY

## 2022-03-14 PROCEDURE — 85460 HEMOGLOBIN FETAL: CPT

## 2022-03-14 PROCEDURE — 700105 HCHG RX REV CODE 258: Performed by: ANESTHESIOLOGY

## 2022-03-14 PROCEDURE — 59025 FETAL NON-STRESS TEST: CPT

## 2022-03-14 PROCEDURE — 59514 CESAREAN DELIVERY ONLY: CPT | Mod: 80 | Performed by: OBSTETRICS & GYNECOLOGY

## 2022-03-14 PROCEDURE — 700105 HCHG RX REV CODE 258: Performed by: OBSTETRICS & GYNECOLOGY

## 2022-03-14 PROCEDURE — 770002 HCHG ROOM/CARE - OB PRIVATE (112)

## 2022-03-14 PROCEDURE — 302449 STATCHG TRIAGE ONLY (STATISTIC)

## 2022-03-14 PROCEDURE — 36415 COLL VENOUS BLD VENIPUNCTURE: CPT

## 2022-03-14 PROCEDURE — 700101 HCHG RX REV CODE 250: Performed by: ANESTHESIOLOGY

## 2022-03-14 PROCEDURE — 160041 HCHG SURGERY MINUTES - EA ADDL 1 MIN LEVEL 4: Performed by: OBSTETRICS & GYNECOLOGY

## 2022-03-14 PROCEDURE — 160035 HCHG PACU - 1ST 60 MINS PHASE I: Performed by: OBSTETRICS & GYNECOLOGY

## 2022-03-14 PROCEDURE — 160029 HCHG SURGERY MINUTES - 1ST 30 MINS LEVEL 4: Performed by: OBSTETRICS & GYNECOLOGY

## 2022-03-14 PROCEDURE — 85025 COMPLETE CBC W/AUTO DIFF WBC: CPT

## 2022-03-14 PROCEDURE — 700102 HCHG RX REV CODE 250 W/ 637 OVERRIDE(OP): Performed by: ANESTHESIOLOGY

## 2022-03-14 PROCEDURE — 85027 COMPLETE CBC AUTOMATED: CPT

## 2022-03-14 PROCEDURE — 160009 HCHG ANES TIME/MIN: Performed by: OBSTETRICS & GYNECOLOGY

## 2022-03-14 PROCEDURE — 160048 HCHG OR STATISTICAL LEVEL 1-5: Performed by: OBSTETRICS & GYNECOLOGY

## 2022-03-14 PROCEDURE — 85461 HEMOGLOBIN FETAL: CPT

## 2022-03-14 PROCEDURE — 86901 BLOOD TYPING SEROLOGIC RH(D): CPT

## 2022-03-14 PROCEDURE — A9270 NON-COVERED ITEM OR SERVICE: HCPCS | Performed by: ANESTHESIOLOGY

## 2022-03-14 PROCEDURE — 87426 SARSCOV CORONAVIRUS AG IA: CPT

## 2022-03-14 PROCEDURE — 700111 HCHG RX REV CODE 636 W/ 250 OVERRIDE (IP): Performed by: ANESTHESIOLOGY

## 2022-03-14 PROCEDURE — 0UQ90ZZ REPAIR UTERUS, OPEN APPROACH: ICD-10-PCS | Performed by: OBSTETRICS & GYNECOLOGY

## 2022-03-14 PROCEDURE — 160002 HCHG RECOVERY MINUTES (STAT): Performed by: OBSTETRICS & GYNECOLOGY

## 2022-03-14 RX ORDER — OXYTOCIN 10 [USP'U]/ML
10 INJECTION, SOLUTION INTRAMUSCULAR; INTRAVENOUS
Status: DISCONTINUED | OUTPATIENT
Start: 2022-03-14 | End: 2022-03-18 | Stop reason: HOSPADM

## 2022-03-14 RX ORDER — METOPROLOL TARTRATE 1 MG/ML
1 INJECTION, SOLUTION INTRAVENOUS
Status: DISCONTINUED | OUTPATIENT
Start: 2022-03-14 | End: 2022-03-14 | Stop reason: HOSPADM

## 2022-03-14 RX ORDER — OXYCODONE HCL 5 MG/5 ML
10 SOLUTION, ORAL ORAL
Status: DISCONTINUED | OUTPATIENT
Start: 2022-03-14 | End: 2022-03-14 | Stop reason: HOSPADM

## 2022-03-14 RX ORDER — MIDAZOLAM HYDROCHLORIDE 1 MG/ML
1 INJECTION INTRAMUSCULAR; INTRAVENOUS
Status: DISCONTINUED | OUTPATIENT
Start: 2022-03-14 | End: 2022-03-14 | Stop reason: HOSPADM

## 2022-03-14 RX ORDER — ONDANSETRON 2 MG/ML
4 INJECTION INTRAMUSCULAR; INTRAVENOUS EVERY 6 HOURS PRN
Status: DISCONTINUED | OUTPATIENT
Start: 2022-03-15 | End: 2022-03-18 | Stop reason: HOSPADM

## 2022-03-14 RX ORDER — OXYCODONE HYDROCHLORIDE 5 MG/1
5 TABLET ORAL EVERY 4 HOURS PRN
Status: DISCONTINUED | OUTPATIENT
Start: 2022-03-15 | End: 2022-03-18 | Stop reason: HOSPADM

## 2022-03-14 RX ORDER — SODIUM CHLORIDE, SODIUM LACTATE, POTASSIUM CHLORIDE, CALCIUM CHLORIDE 600; 310; 30; 20 MG/100ML; MG/100ML; MG/100ML; MG/100ML
INJECTION, SOLUTION INTRAVENOUS CONTINUOUS
Status: DISCONTINUED | OUTPATIENT
Start: 2022-03-14 | End: 2022-03-14

## 2022-03-14 RX ORDER — ONDANSETRON 2 MG/ML
4 INJECTION INTRAMUSCULAR; INTRAVENOUS
Status: DISCONTINUED | OUTPATIENT
Start: 2022-03-14 | End: 2022-03-14 | Stop reason: HOSPADM

## 2022-03-14 RX ORDER — LABETALOL HYDROCHLORIDE 5 MG/ML
5 INJECTION, SOLUTION INTRAVENOUS
Status: DISCONTINUED | OUTPATIENT
Start: 2022-03-14 | End: 2022-03-14 | Stop reason: HOSPADM

## 2022-03-14 RX ORDER — OXYCODONE HYDROCHLORIDE 10 MG/1
10 TABLET ORAL EVERY 4 HOURS PRN
Status: DISCONTINUED | OUTPATIENT
Start: 2022-03-15 | End: 2022-03-18 | Stop reason: HOSPADM

## 2022-03-14 RX ORDER — ONDANSETRON 4 MG/1
4 TABLET, ORALLY DISINTEGRATING ORAL EVERY 6 HOURS PRN
Status: DISCONTINUED | OUTPATIENT
Start: 2022-03-15 | End: 2022-03-18 | Stop reason: HOSPADM

## 2022-03-14 RX ORDER — CEFAZOLIN SODIUM 1 G/3ML
2 INJECTION, POWDER, FOR SOLUTION INTRAMUSCULAR; INTRAVENOUS ONCE
Status: COMPLETED | OUTPATIENT
Start: 2022-03-14 | End: 2022-03-14

## 2022-03-14 RX ORDER — SODIUM CHLORIDE, SODIUM GLUCONATE, SODIUM ACETATE, POTASSIUM CHLORIDE AND MAGNESIUM CHLORIDE 526; 502; 368; 37; 30 MG/100ML; MG/100ML; MG/100ML; MG/100ML; MG/100ML
1500 INJECTION, SOLUTION INTRAVENOUS ONCE
Status: COMPLETED | OUTPATIENT
Start: 2022-03-14 | End: 2022-03-14

## 2022-03-14 RX ORDER — SODIUM CHLORIDE, SODIUM LACTATE, POTASSIUM CHLORIDE, CALCIUM CHLORIDE 600; 310; 30; 20 MG/100ML; MG/100ML; MG/100ML; MG/100ML
INJECTION, SOLUTION INTRAVENOUS PRN
Status: DISCONTINUED | OUTPATIENT
Start: 2022-03-14 | End: 2022-03-14

## 2022-03-14 RX ORDER — KETOROLAC TROMETHAMINE 30 MG/ML
30 INJECTION, SOLUTION INTRAMUSCULAR; INTRAVENOUS EVERY 6 HOURS
Status: COMPLETED | OUTPATIENT
Start: 2022-03-14 | End: 2022-03-15

## 2022-03-14 RX ORDER — HYDROMORPHONE HYDROCHLORIDE 1 MG/ML
0.4 INJECTION, SOLUTION INTRAMUSCULAR; INTRAVENOUS; SUBCUTANEOUS
Status: DISCONTINUED | OUTPATIENT
Start: 2022-03-14 | End: 2022-03-14 | Stop reason: HOSPADM

## 2022-03-14 RX ORDER — SODIUM CHLORIDE, SODIUM GLUCONATE, SODIUM ACETATE, POTASSIUM CHLORIDE AND MAGNESIUM CHLORIDE 526; 502; 368; 37; 30 MG/100ML; MG/100ML; MG/100ML; MG/100ML; MG/100ML
INJECTION, SOLUTION INTRAVENOUS
Status: DISCONTINUED | OUTPATIENT
Start: 2022-03-14 | End: 2022-03-14 | Stop reason: SURG

## 2022-03-14 RX ORDER — DIPHENHYDRAMINE HYDROCHLORIDE 50 MG/ML
12.5 INJECTION INTRAMUSCULAR; INTRAVENOUS EVERY 6 HOURS PRN
Status: DISCONTINUED | OUTPATIENT
Start: 2022-03-14 | End: 2022-03-18 | Stop reason: HOSPADM

## 2022-03-14 RX ORDER — KETOROLAC TROMETHAMINE 30 MG/ML
INJECTION, SOLUTION INTRAMUSCULAR; INTRAVENOUS PRN
Status: DISCONTINUED | OUTPATIENT
Start: 2022-03-14 | End: 2022-03-14 | Stop reason: SURG

## 2022-03-14 RX ORDER — MISOPROSTOL 200 UG/1
800 TABLET ORAL
Status: DISCONTINUED | OUTPATIENT
Start: 2022-03-14 | End: 2022-03-18 | Stop reason: HOSPADM

## 2022-03-14 RX ORDER — METHYLERGONOVINE MALEATE 0.2 MG/ML
0.2 INJECTION INTRAVENOUS
Status: DISCONTINUED | OUTPATIENT
Start: 2022-03-14 | End: 2022-03-18 | Stop reason: HOSPADM

## 2022-03-14 RX ORDER — VITAMIN A ACETATE, BETA CAROTENE, ASCORBIC ACID, CHOLECALCIFEROL, .ALPHA.-TOCOPHEROL ACETATE, DL-, THIAMINE MONONITRATE, RIBOFLAVIN, NIACINAMIDE, PYRIDOXINE HYDROCHLORIDE, FOLIC ACID, CYANOCOBALAMIN, CALCIUM CARBONATE, FERROUS FUMARATE, ZINC OXIDE, CUPRIC OXIDE 3080; 12; 120; 400; 1; 1.84; 3; 20; 22; 920; 25; 200; 27; 10; 2 [IU]/1; UG/1; MG/1; [IU]/1; MG/1; MG/1; MG/1; MG/1; MG/1; [IU]/1; MG/1; MG/1; MG/1; MG/1; MG/1
1 TABLET, FILM COATED ORAL
Status: DISCONTINUED | OUTPATIENT
Start: 2022-03-14 | End: 2022-03-18 | Stop reason: HOSPADM

## 2022-03-14 RX ORDER — HYDROMORPHONE HYDROCHLORIDE 1 MG/ML
0.1 INJECTION, SOLUTION INTRAMUSCULAR; INTRAVENOUS; SUBCUTANEOUS
Status: DISCONTINUED | OUTPATIENT
Start: 2022-03-14 | End: 2022-03-14 | Stop reason: HOSPADM

## 2022-03-14 RX ORDER — SODIUM CHLORIDE, SODIUM LACTATE, POTASSIUM CHLORIDE, AND CALCIUM CHLORIDE .6; .31; .03; .02 G/100ML; G/100ML; G/100ML; G/100ML
1000 INJECTION, SOLUTION INTRAVENOUS ONCE
Status: COMPLETED | OUTPATIENT
Start: 2022-03-14 | End: 2022-03-14

## 2022-03-14 RX ORDER — ONDANSETRON 2 MG/ML
INJECTION INTRAMUSCULAR; INTRAVENOUS PRN
Status: DISCONTINUED | OUTPATIENT
Start: 2022-03-14 | End: 2022-03-14 | Stop reason: SURG

## 2022-03-14 RX ORDER — HYDROMORPHONE HYDROCHLORIDE 1 MG/ML
0.4 INJECTION, SOLUTION INTRAMUSCULAR; INTRAVENOUS; SUBCUTANEOUS
Status: ACTIVE | OUTPATIENT
Start: 2022-03-14 | End: 2022-03-15

## 2022-03-14 RX ORDER — DIPHENHYDRAMINE HYDROCHLORIDE 50 MG/ML
25 INJECTION INTRAMUSCULAR; INTRAVENOUS EVERY 6 HOURS PRN
Status: DISCONTINUED | OUTPATIENT
Start: 2022-03-15 | End: 2022-03-18 | Stop reason: HOSPADM

## 2022-03-14 RX ORDER — DOCUSATE SODIUM 100 MG/1
100 CAPSULE, LIQUID FILLED ORAL 2 TIMES DAILY PRN
Status: DISCONTINUED | OUTPATIENT
Start: 2022-03-14 | End: 2022-03-18 | Stop reason: HOSPADM

## 2022-03-14 RX ORDER — OXYCODONE HYDROCHLORIDE 5 MG/1
5 TABLET ORAL EVERY 4 HOURS PRN
Status: DISPENSED | OUTPATIENT
Start: 2022-03-14 | End: 2022-03-15

## 2022-03-14 RX ORDER — HYDROMORPHONE HYDROCHLORIDE 1 MG/ML
0.2 INJECTION, SOLUTION INTRAMUSCULAR; INTRAVENOUS; SUBCUTANEOUS
Status: ACTIVE | OUTPATIENT
Start: 2022-03-14 | End: 2022-03-15

## 2022-03-14 RX ORDER — OXYCODONE HYDROCHLORIDE 10 MG/1
10 TABLET ORAL EVERY 4 HOURS PRN
Status: ACTIVE | OUTPATIENT
Start: 2022-03-14 | End: 2022-03-15

## 2022-03-14 RX ORDER — DIPHENHYDRAMINE HYDROCHLORIDE 50 MG/ML
12.5 INJECTION INTRAMUSCULAR; INTRAVENOUS
Status: DISCONTINUED | OUTPATIENT
Start: 2022-03-14 | End: 2022-03-14 | Stop reason: HOSPADM

## 2022-03-14 RX ORDER — DIPHENHYDRAMINE HYDROCHLORIDE 50 MG/ML
25 INJECTION INTRAMUSCULAR; INTRAVENOUS EVERY 6 HOURS PRN
Status: DISCONTINUED | OUTPATIENT
Start: 2022-03-14 | End: 2022-03-18 | Stop reason: HOSPADM

## 2022-03-14 RX ORDER — BUPIVACAINE HYDROCHLORIDE 7.5 MG/ML
INJECTION, SOLUTION INTRASPINAL PRN
Status: DISCONTINUED | OUTPATIENT
Start: 2022-03-14 | End: 2022-03-14 | Stop reason: SURG

## 2022-03-14 RX ORDER — DIPHENHYDRAMINE HYDROCHLORIDE 50 MG/ML
12.5 INJECTION INTRAMUSCULAR; INTRAVENOUS EVERY 6 HOURS PRN
Status: ACTIVE | OUTPATIENT
Start: 2022-03-14 | End: 2022-03-15

## 2022-03-14 RX ORDER — HYDROMORPHONE HYDROCHLORIDE 1 MG/ML
0.2 INJECTION, SOLUTION INTRAMUSCULAR; INTRAVENOUS; SUBCUTANEOUS
Status: DISCONTINUED | OUTPATIENT
Start: 2022-03-14 | End: 2022-03-14 | Stop reason: HOSPADM

## 2022-03-14 RX ORDER — ONDANSETRON 2 MG/ML
4 INJECTION INTRAMUSCULAR; INTRAVENOUS EVERY 6 HOURS PRN
Status: ACTIVE | OUTPATIENT
Start: 2022-03-14 | End: 2022-03-15

## 2022-03-14 RX ORDER — OXYCODONE HCL 5 MG/5 ML
5 SOLUTION, ORAL ORAL
Status: DISCONTINUED | OUTPATIENT
Start: 2022-03-14 | End: 2022-03-14 | Stop reason: HOSPADM

## 2022-03-14 RX ORDER — METOCLOPRAMIDE HYDROCHLORIDE 5 MG/ML
10 INJECTION INTRAMUSCULAR; INTRAVENOUS ONCE
Status: COMPLETED | OUTPATIENT
Start: 2022-03-14 | End: 2022-03-14

## 2022-03-14 RX ORDER — HYDRALAZINE HYDROCHLORIDE 20 MG/ML
5 INJECTION INTRAMUSCULAR; INTRAVENOUS
Status: DISCONTINUED | OUTPATIENT
Start: 2022-03-14 | End: 2022-03-14 | Stop reason: HOSPADM

## 2022-03-14 RX ORDER — DIPHENHYDRAMINE HCL 25 MG
25 TABLET ORAL EVERY 6 HOURS PRN
Status: DISCONTINUED | OUTPATIENT
Start: 2022-03-15 | End: 2022-03-18 | Stop reason: HOSPADM

## 2022-03-14 RX ORDER — IBUPROFEN 800 MG/1
800 TABLET ORAL EVERY 8 HOURS PRN
Status: DISCONTINUED | OUTPATIENT
Start: 2022-03-18 | End: 2022-03-18 | Stop reason: HOSPADM

## 2022-03-14 RX ORDER — ACETAMINOPHEN 500 MG
1000 TABLET ORAL EVERY 6 HOURS
Status: COMPLETED | OUTPATIENT
Start: 2022-03-14 | End: 2022-03-15

## 2022-03-14 RX ORDER — PHENYLEPHRINE HCL IN 0.9% NACL 0.5 MG/5ML
SYRINGE (ML) INTRAVENOUS PRN
Status: DISCONTINUED | OUTPATIENT
Start: 2022-03-14 | End: 2022-03-14 | Stop reason: SURG

## 2022-03-14 RX ORDER — SODIUM CHLORIDE, SODIUM LACTATE, POTASSIUM CHLORIDE, CALCIUM CHLORIDE 600; 310; 30; 20 MG/100ML; MG/100ML; MG/100ML; MG/100ML
INJECTION, SOLUTION INTRAVENOUS ONCE
Status: DISCONTINUED | OUTPATIENT
Start: 2022-03-14 | End: 2022-03-14

## 2022-03-14 RX ORDER — IBUPROFEN 800 MG/1
800 TABLET ORAL EVERY 8 HOURS
Status: COMPLETED | OUTPATIENT
Start: 2022-03-15 | End: 2022-03-18

## 2022-03-14 RX ORDER — HALOPERIDOL 5 MG/ML
1 INJECTION INTRAMUSCULAR
Status: DISCONTINUED | OUTPATIENT
Start: 2022-03-14 | End: 2022-03-14 | Stop reason: HOSPADM

## 2022-03-14 RX ORDER — MEPERIDINE HYDROCHLORIDE 25 MG/ML
12.5 INJECTION INTRAMUSCULAR; INTRAVENOUS; SUBCUTANEOUS
Status: DISCONTINUED | OUTPATIENT
Start: 2022-03-14 | End: 2022-03-14 | Stop reason: HOSPADM

## 2022-03-14 RX ORDER — ACETAMINOPHEN 500 MG
1000 TABLET ORAL EVERY 6 HOURS PRN
Status: DISCONTINUED | OUTPATIENT
Start: 2022-03-18 | End: 2022-03-18 | Stop reason: HOSPADM

## 2022-03-14 RX ORDER — ACETAMINOPHEN 500 MG
1000 TABLET ORAL EVERY 6 HOURS
Status: COMPLETED | OUTPATIENT
Start: 2022-03-15 | End: 2022-03-18

## 2022-03-14 RX ORDER — CITRIC ACID/SODIUM CITRATE 334-500MG
30 SOLUTION, ORAL ORAL ONCE
Status: COMPLETED | OUTPATIENT
Start: 2022-03-14 | End: 2022-03-14

## 2022-03-14 RX ORDER — MORPHINE SULFATE 0.5 MG/ML
INJECTION, SOLUTION EPIDURAL; INTRATHECAL; INTRAVENOUS PRN
Status: DISCONTINUED | OUTPATIENT
Start: 2022-03-14 | End: 2022-03-14 | Stop reason: SURG

## 2022-03-14 RX ADMIN — KETOROLAC TROMETHAMINE 30 MG: 30 INJECTION, SOLUTION INTRAMUSCULAR; INTRAVENOUS at 21:00

## 2022-03-14 RX ADMIN — MORPHINE SULFATE 150 MCG: 0.5 INJECTION, SOLUTION EPIDURAL; INTRATHECAL; INTRAVENOUS at 11:58

## 2022-03-14 RX ADMIN — BUPIVACAINE HYDROCHLORIDE IN DEXTROSE 1.8 ML: 7.5 INJECTION, SOLUTION SUBARACHNOID at 11:58

## 2022-03-14 RX ADMIN — SODIUM CHLORIDE, SODIUM GLUCONATE, SODIUM ACETATE, POTASSIUM CHLORIDE AND MAGNESIUM CHLORIDE 1500 ML: 526; 502; 368; 37; 30 INJECTION, SOLUTION INTRAVENOUS at 11:30

## 2022-03-14 RX ADMIN — CEFAZOLIN 2 G: 330 INJECTION, POWDER, FOR SOLUTION INTRAMUSCULAR; INTRAVENOUS at 12:00

## 2022-03-14 RX ADMIN — EPHEDRINE SULFATE 5 MG: 50 INJECTION, SOLUTION INTRAVENOUS at 12:34

## 2022-03-14 RX ADMIN — OXYTOCIN 1000 ML: 10 INJECTION, SOLUTION INTRAMUSCULAR; INTRAVENOUS at 12:16

## 2022-03-14 RX ADMIN — KETOROLAC TROMETHAMINE 30 MG: 30 INJECTION, SOLUTION INTRAMUSCULAR at 12:34

## 2022-03-14 RX ADMIN — ACETAMINOPHEN 1000 MG: 500 TABLET ORAL at 15:56

## 2022-03-14 RX ADMIN — KETOROLAC TROMETHAMINE 30 MG: 30 INJECTION, SOLUTION INTRAMUSCULAR; INTRAVENOUS at 15:57

## 2022-03-14 RX ADMIN — SODIUM CHLORIDE, SODIUM GLUCONATE, SODIUM ACETATE, POTASSIUM CHLORIDE AND MAGNESIUM CHLORIDE: 526; 502; 368; 37; 30 INJECTION, SOLUTION INTRAVENOUS at 11:50

## 2022-03-14 RX ADMIN — FENTANYL CITRATE 15 MCG: 50 INJECTION, SOLUTION INTRAMUSCULAR; INTRAVENOUS at 11:58

## 2022-03-14 RX ADMIN — ONDANSETRON 4 MG: 2 INJECTION INTRAMUSCULAR; INTRAVENOUS at 12:00

## 2022-03-14 RX ADMIN — Medication 50 MCG: at 11:58

## 2022-03-14 RX ADMIN — SODIUM CHLORIDE, POTASSIUM CHLORIDE, SODIUM LACTATE AND CALCIUM CHLORIDE: 600; 310; 30; 20 INJECTION, SOLUTION INTRAVENOUS at 12:00

## 2022-03-14 RX ADMIN — SODIUM CHLORIDE, POTASSIUM CHLORIDE, SODIUM LACTATE AND CALCIUM CHLORIDE 1000 ML: 600; 310; 30; 20 INJECTION, SOLUTION INTRAVENOUS at 10:45

## 2022-03-14 RX ADMIN — ACETAMINOPHEN 1000 MG: 500 TABLET ORAL at 21:00

## 2022-03-14 RX ADMIN — DIPHENHYDRAMINE HYDROCHLORIDE 12.5 MG: 50 INJECTION INTRAMUSCULAR; INTRAVENOUS at 22:37

## 2022-03-14 RX ADMIN — PHENYLEPHRINE HYDROCHLORIDE 50 MCG/MIN: 10 INJECTION INTRAVENOUS at 11:58

## 2022-03-14 RX ADMIN — OXYTOCIN 125 ML/HR: 10 INJECTION, SOLUTION INTRAMUSCULAR; INTRAVENOUS at 13:52

## 2022-03-14 RX ADMIN — FAMOTIDINE 20 MG: 10 INJECTION, SOLUTION INTRAVENOUS at 11:40

## 2022-03-14 RX ADMIN — METOCLOPRAMIDE 10 MG: 5 INJECTION, SOLUTION INTRAMUSCULAR; INTRAVENOUS at 11:40

## 2022-03-14 RX ADMIN — SODIUM CITRATE AND CITRIC ACID MONOHYDRATE 30 ML: 500; 334 SOLUTION ORAL at 11:40

## 2022-03-14 ASSESSMENT — PAIN DESCRIPTION - PAIN TYPE
TYPE: SURGICAL PAIN
TYPE: ACUTE PAIN;SURGICAL PAIN
TYPE: SURGICAL PAIN
TYPE: ACUTE PAIN;SURGICAL PAIN
TYPE: ACUTE PAIN
TYPE: ACUTE PAIN;SURGICAL PAIN

## 2022-03-14 ASSESSMENT — LIFESTYLE VARIABLES
HAVE YOU EVER FELT YOU SHOULD CUT DOWN ON YOUR DRINKING: NO
ALCOHOL_USE: NO
EVER_SMOKED: NEVER

## 2022-03-14 ASSESSMENT — COPD QUESTIONNAIRES
DURING THE PAST 4 WEEKS HOW MUCH DID YOU FEEL SHORT OF BREATH: NONE/LITTLE OF THE TIME
COPD SCREENING SCORE: 0
DO YOU EVER COUGH UP ANY MUCUS OR PHLEGM?: NO/ONLY WITH OCCASIONAL COLDS OR INFECTIONS
IN THE PAST 12 MONTHS DO YOU DO LESS THAN YOU USED TO BECAUSE OF YOUR BREATHING PROBLEMS: DISAGREE/UNSURE
HAVE YOU SMOKED AT LEAST 100 CIGARETTES IN YOUR ENTIRE LIFE: NO/DON'T KNOW

## 2022-03-14 ASSESSMENT — PAIN SCALES - GENERAL: PAIN_LEVEL: 0

## 2022-03-14 NOTE — OR SURGEON
Immediate Post OP Note    PreOp Diagnosis:     35 5/7 wks  Prior classical Csection  Suspect uterine rupture      PostOp Diagnosis:     35 5/7 wks  Prior classical Csection  Confirmed uterine rupture  hemoperitoneum      Procedure(s):   SECTION, REPEAT, REPAIR UTERINE RUPTURE - Wound Class: Clean Contaminated    Surgeon(s):  RACHEL Ramirez D.O.    Anesthesiologist/Type of Anesthesia:  Anesthesiologist: Jair Rojas D.O./Spinal    Surgical Staff:  Circulator: Elisa Noble R.N.  Scrub Person: Asmita MCKEON&MARY Circulator Assistant: Dorene Pérez R.N.  L&MARY Baby  Nurse: Kristi Lucas R.N.    Specimens removed if any:  * No specimens in log *    Estimated Blood Loss: 1000 CC    Findings:     BABY---female, APGARs 8-9, 2500 grams  4 cm diameter anterior midline uterine rupture, lower fundus, with placental  extrusion and hemoperitoneum    CORD GASSES: pending      Complications: none        3/14/2022 1:02 PM Jairo Johnson M.D.

## 2022-03-14 NOTE — PROGRESS NOTES
1100: Assumed care of pt. AAO, repeat c/s called by Dr. Johnson for possible abruption  1151: Pt to OR2 via wheelchair  1216: Viable female per Dr. Johnson, infant assigned 8/9 APGARS  1254: Pt to PACU in stable condition.

## 2022-03-14 NOTE — ANESTHESIA PROCEDURE NOTES
Spinal Block    Date/Time: 3/14/2022 11:56 AM  Performed by: Jair Rojas D.O.  Authorized by: Jair Rojas D.O.     Patient Location:  OR  Start Time:  3/14/2022 11:56 AM  End Time:  3/14/2022 11:58 AM  Reason for Block: primary anesthetic    patient identified, IV checked, site marked, risks and benefits discussed, surgical consent, monitors and equipment checked, pre-op evaluation and timeout performed    Patient Position:  Sitting  Prep: ChloraPrep, patient draped and sterile technique    Monitoring:  Blood pressure, continuous pulse oximetry and heart rate  Approach:  Midline  Location:  L4-5  Injection Technique:  Single-shot  Skin infiltration:  Lidocaine  Strength:  1%  Dose:  3ml  Needle Type:  Pencan  Needle Gauge:  25 G  CSF flowing pre/post injection:  Yes  Sensory Level:  T4

## 2022-03-14 NOTE — H&P
DATE OF ADMISSION:  2022     CHIEF COMPLAINT:  Constant severe lower abdominal pain for 2.5 hours, history   of classical .     HISTORY OF PRESENT ILLNESS:  The patient is a 31-year-old lady,  2,   para 1 with an LINDA of 2022 making her EGA 35 and 5/7th weeks.  She was   scheduled for a repeat  at 36.5 weeks because of a previous  classical    with an anchor-shaped hysterotomy.  She presents now with 2.5 hours   of severe relentless bilateral lower abdominal pain and tenderness.  Prior to   that, she felt perfectly healthy.  She denies trauma. She has had no vaginal bleeding.  Fetal   monitoring is reassuring.  She is melva.  A quick bedside ultrasound by   me showed no obvious evidence of hemoperitoneum.  Her vital signs are stable.    Preparations are being made for emergent repeat  section for   suspected uterine rupture.  Risks and benefits have been discussed on   multiple occasions throughout the pregnancy.  She is in complete agreement   with my recommendation to deliver.  She did have a complete course of   betamethasone when she was admitted at 32 and 5/7th weeks' gestation.  She is   known to be Rh negative and she had RhoGAM during that admission.  Recent   vaginal group B strep test was negative.     PRENATAL CARE:  She has been on weekly progesterone injections since 16 weeks'   because of a history of idiopathic  delivery in her prior pregnancy.    Her blood type is O negative.  She did receive RhoGAM.  A 3-hour glucose   tolerance test was within normal limits.  Genetic screening was reassuring.    Ultrasounds have revealed normal fetal anatomy and growth with anterior fundal   placenta.     OBSTETRIC HISTORY:  1.  Primary classical  section at 27 and 6/7th weeks in [facility]   2019 after a pregnancy complicated by  labor at 25 weeks.  She was   discharged undelivered and then readmitted with  rupture  of membranes   and recurrent labor.  The baby was breech.  Baby weighed 1080 grams.  2.  Present pregnancy.     PAST MEDICAL HISTORY:  Positive for idiopathic  labor and    classical  section.     ALLERGIES:  No known drug allergies.     PAST SURGICAL HISTORY:  Classical  with anchor-shaped hysterotomy   2019.     SOCIAL HISTORY:  She is  to Humphrey.  She denies alcohol, tobacco or drug   consumption.  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     FAMILY HISTORY:  Positive for mother with chronic hypertension in her father;  paternal grandfather with heart disease;  paternal uncle with prostate cancer.     PHYSICAL EXAMINATION:  GENERAL:  She is in distress secondary to constant lower abdominal pain.  VITAL SIGNS:  Temp 98.1, pulse 79, respirations 15, /75.  HEENT:  Normal.  LUNGS:  Clear to auscultation.  HEART:  Sounds normal.  ABDOMEN:  Her fundus is nontender.  Her lower abdomen and uterus are markedly   tender to even slight palpation.  PELVIC:  Cervical exam per RN, 1 cm dilated, 50% effaced with the baby's head   at -3 station.  EXTREMITIES:  No edema.  Homans' negative.  DTRs normal.     LABORATORY DATA:  Hemoglobin 15.5; hematocrit 43.7; white blood count 12,900;   platelet count 201,000.  Vaginal group B strep PCR negative on 2022.     MONITOR: FHR Category I, contractions every 2-3 minutes    DIAGNOSES:  1.  A 35 and 5/7th weeks' gestation.  2.  Previous  classical  section with anchor-shaped   hysterotomy, high risk for uterine rupture.  3.  Relentless severe lower abdominal pain and tenderness for 2.5 hours,   suspect uterine rupture.     PLAN:  Preparations are being made for expedient repeat  section.        ______________________________  Jairo Johnson MD        DD:  2022 11:26  DT:  2022 12:10    Job#:  [id number]

## 2022-03-14 NOTE — PROGRESS NOTES
1005- pt to triage bed 4 c/o diffuse abd pain that started at 0845 today. Pt denies lof or bleeding and states +fm. Lower abd tender when placed belt on pt. Pt placed on efm/toco and audible fht 150. Pt scheduled for r c/s on sat due to hx of classical in 2019. Will call dr joyce for orders.    1015- spoke to dr joyce and orders rec'd for iv,ivf,npo and labs.    1040- ivf infusing and labs and covid sent.     1055- pt in tears with discomfort. U/s in room and dr joyce on his way to see pt    1100- dr joyce at bedside. U/s called    1115- report given to ad pineda

## 2022-03-14 NOTE — ANESTHESIA PREPROCEDURE EVALUATION
Case: [id number] Date/Time: 22 1145    Procedure:  SECTION, REPEAT    Pre-op diagnosis:       PRIOR CLASSICAL  SECTION      -CLASSICAL     Location: [facility] OR 02 / SURGERY LABOR AND DELIVERY    Surgeons: Jairo Johnson M.D.         @ 35w5d, IUP, Mendez  Prior C/S with classical incision  Possible uterine rupture  Last ate @ 0800  Relevant Problems   OB   (positive) History of classical  section       Physical Exam    Airway   Mallampati: II  TM distance: >3 FB  Neck ROM: full       Cardiovascular - normal exam  Rhythm: regular  Rate: normal  (-) murmur     Dental - normal exam           Pulmonary - normal exam  Breath sounds clear to auscultation     Abdominal    Neurological - normal exam                 Anesthesia Plan    ASA 2- EMERGENT (Prior c/s with possible uterine rupture)   ASA physical status emergent criteria: other (comment)    Plan - spinal   Neuraxial block will be primary anesthetic                Postoperative Plan: Postoperative administration of opioids is intended.    Pertinent diagnostic labs and testing reviewed    Informed Consent:    Anesthetic plan and risks discussed with patient.

## 2022-03-14 NOTE — PROGRESS NOTES
H and P dictated.    35 5/7 wks,     Suspect uterine rupture, prior classical Csection with anchor-shaped hysterotomy.  Severe lower abd pain and tenderness.    FHR Cat I  Ivy    S/p betamethsasone 3 weeks ago.    PLAN: Csection.

## 2022-03-14 NOTE — ANESTHESIA TIME REPORT
Anesthesia Start and Stop Event Times     Date Time Event    3/14/2022 1143 Ready for Procedure     1150 Anesthesia Start     1258 Anesthesia Stop        Responsible Staff  22    Name Role Begin End    Jair Rojas D.O. Anesth 1150 1258        Preop Diagnosis (Free Text):  Pre-op Diagnosis     PRIOR CLASSICAL  SECTION  -CLASSICAL         Preop Diagnosis (Codes):  Diagnosis Information     Diagnosis Code(s): Status post repeat low transverse  section [Z98.891]        Premium Reason  Non-Premium    Comments:

## 2022-03-14 NOTE — ANESTHESIA POSTPROCEDURE EVALUATION
Patient: Liss Gan    Procedure Summary     Date: 22 Room / Location: [facility] OR 02 / SURGERY LABOR AND DELIVERY    Anesthesia Start: 1150 Anesthesia Stop: 1258    Procedure:  SECTION, REPEAT (Abdomen) Diagnosis:       Status post repeat low transverse  section      (repeat low transverse  section)    Surgeons: Jairo Johnson M.D. Responsible Provider: Jair Rojas D.O.    Anesthesia Type: spinal ASA Status: 2 - Emergent          Final Anesthesia Type: spinal  Last vitals  BP   Blood Pressure: (!) 94/51    Temp   36.7 °C (98.1 °F)    Pulse   96   Resp   15    SpO2   97 %      Anesthesia Post Evaluation    Patient location during evaluation: PACU  Patient participation: complete - patient participated  Level of consciousness: awake and alert  Pain score: 0    Airway patency: patent  Anesthetic complications: no  Cardiovascular status: hemodynamically stable  Respiratory status: acceptable  Hydration status: euvolemic    PONV: none          No complications documented.     Nurse Pain Score: 0 (NPRS)

## 2022-03-14 NOTE — PROGRESS NOTES
1254: Pt transferred to postpartum via gurney, report given to Roxanne VILLANUEVA. Pt in stable condition with a firm fundus and light lochia.

## 2022-03-14 NOTE — OP REPORT
DATE OF SERVICE:  2022     OPERATIONS:  Repeat low transverse  section and repair of uterine   rupture.     SURGEON:  Jairo Johnson MD     ASSISTANT:  Cece Babcock MD     ANESTHESIOLOGIST:  Jair Rojas DO     ANESTHESIA:  Spinal.     PREOPERATIVE DIAGNOSES:  1.  A 35 and 5/7th week gestation.  2.  Previous classical  section.  3.  Suspected uterine rupture.     POSTOPERATIVE DIAGNOSES:  1.  A 35 and 5/7th week gestation, delivered.  2.  Previous classical  section.  3.  Confirmed uterine rupture.  4.  Hemoperitoneum secondary to uterine rupture.     COMPLICATIONS:  None.     ESTIMATED BLOOD LOSS:  1000 mL.     FINDINGS:  Baby--- female, 1 minute Apgar 8, 5 minute Apgar 9.  Weight 2500   grams.      CORD GASSES:     3/14/2022 12:20   Cord Bg Ph 7.29   Cord Bg Pco2 51.5   Cord Bg Po2 14.0   Cord Bg Hco3 24   Cord Bg Base Excess -3   Cord Bg O2 Saturation 28.6   CV Ph 7.31   CV Pco2 48.4   CV Po2 21.5   CV Hco3 24   CV Base Excess -3   CV O2 Saturation 51.8       INTERESTING ANATOMIC FINDINGS:  hemoperitoneum was   noted on peritoneal entry, and a 4 cm diameter anterior midline uterine rupture in the   lower fundus was noted, with placental extrusion and resulting hemoperitoneum.     INDICATIONS:  This 31-year-old lady is now .  She had a previous classical    with an anchor-shaped hysterotomy.  She is group B strep negative.  She   received a full course of betamethasone during an admission 3 weeks ago.  She presented 5 days before   her scheduled repeat  section with severe relentless bilateral lower   abdominal pain and severe lower abdominal tenderness.  Fetal heart monitoring   was reassuring.  She was melva every 2-3 minutes.  She denied bleeding.    Preparations were made for expedient repeat  section because of a   high suspicion of uterine rupture.       DESCRIPTION OF PROCEDURE:  The patient went to the OR.  Spinal  anesthesia was   administered.  She was prepped and draped.  Timeout was done.  I made a large   Pfannenstiel skin incision by excising her prominent Pfannenstiel  keloid scar.  I incised a   very thin layer of subcutaneous fat.  I incised the rectus fascia   transversely.  I used scissors and electrocautery to separate the fascia from   the underlying rectus muscles.  I entered the peritoneum in the midline well   away from the bladder.  Hemoperitoneum was noted on entry.  The peritoneal   incision was enlarged.  An Abimael O retractor was placed.  We immediately   noted the presence of a 4 cm diameter anterior midline lower fundal uterine   rupture with placental extrusion and hemoperitoneum.  I used scissors to   dissect some adhesions to free the bladder up from the lower uterine segment.    I made a low transverse myometrial incision, which became contiguous with the area of   uterine scar dehiscence.  The baby's head was extracted from left occiput   transverse position with no difficulty.  Clear amniotic fluid was encountered   on delivery.  I bulb suctioned the mouth and nares.  There were no nuchal   cords.  The shoulders and body delivered easily.  Thirty seconds after the   delivery, the cord was triply clamped and cut and the baby was handed off.    Cord gases were sent.  The placenta and all trailing membranes delivered   spontaneously.  I sponge curetted the endometrial cavity to make sure there   were no retained products of conception.  I closed the midline vertical aspect   of the hysterotomy, in the area of the uterine rupture, with full thickness   running interlocking 0 Vicryl, obtaining good approximation and hemostasis.  I   then approximated the low transverse aspect of the hysterotomy with full-thickness   running interlocking 0 Vicryl.  I placed a second layer of 0 Vicryl   imbricating the first layer.  I applied pressure to the hysterotomy closure   for several minutes. Good approximation  and hemostasis was noted.  Both   fallopian tubes and ovaries were inspected.  There were no adnexal masses   evident.  The Abimael O was removed.  Seprafilm adhesion barrier was   applied to the hysterotomy site.  I closed the anterior parietal peritoneum   and the posterior layer of the rectus sheath with running 2-0 Vicryl. I   closed both layers of the rectus abdominis fascia with running 0 PDS.  I   cauterized several small subcutaneous bleeders.  I closed the skin with Insorb   resorbable subcutaneous staples, 1/2 inch wide Steri-Strips and a Mepilex Ag   dressing were placed.  Sponge and needle counts were correct.        ______________________________  MD JOE Toscano/ANUPAM    DD:  03/14/2022 13:18  DT:  03/14/2022 13:47    Job#:  [id number]    CC:Cece Babcock DO(User)

## 2022-03-15 PROCEDURE — 770002 HCHG ROOM/CARE - OB PRIVATE (112)

## 2022-03-15 PROCEDURE — 700111 HCHG RX REV CODE 636 W/ 250 OVERRIDE (IP): Performed by: ANESTHESIOLOGY

## 2022-03-15 PROCEDURE — A9270 NON-COVERED ITEM OR SERVICE: HCPCS | Performed by: OBSTETRICS & GYNECOLOGY

## 2022-03-15 PROCEDURE — 3E0334Z INTRODUCTION OF SERUM, TOXOID AND VACCINE INTO PERIPHERAL VEIN, PERCUTANEOUS APPROACH: ICD-10-PCS | Performed by: OBSTETRICS & GYNECOLOGY

## 2022-03-15 PROCEDURE — 700102 HCHG RX REV CODE 250 W/ 637 OVERRIDE(OP): Performed by: OBSTETRICS & GYNECOLOGY

## 2022-03-15 PROCEDURE — 700102 HCHG RX REV CODE 250 W/ 637 OVERRIDE(OP): Performed by: ANESTHESIOLOGY

## 2022-03-15 PROCEDURE — A9270 NON-COVERED ITEM OR SERVICE: HCPCS | Performed by: ANESTHESIOLOGY

## 2022-03-15 RX ADMIN — ACETAMINOPHEN 1000 MG: 500 TABLET ORAL at 09:35

## 2022-03-15 RX ADMIN — ACETAMINOPHEN 1000 MG: 500 TABLET ORAL at 02:55

## 2022-03-15 RX ADMIN — OXYCODONE 5 MG: 5 TABLET ORAL at 04:46

## 2022-03-15 RX ADMIN — ACETAMINOPHEN 1000 MG: 500 TABLET ORAL at 15:40

## 2022-03-15 RX ADMIN — KETOROLAC TROMETHAMINE 30 MG: 30 INJECTION, SOLUTION INTRAMUSCULAR; INTRAVENOUS at 09:34

## 2022-03-15 RX ADMIN — OXYCODONE 5 MG: 5 TABLET ORAL at 00:19

## 2022-03-15 RX ADMIN — KETOROLAC TROMETHAMINE 30 MG: 30 INJECTION, SOLUTION INTRAMUSCULAR; INTRAVENOUS at 02:55

## 2022-03-15 RX ADMIN — IBUPROFEN 800 MG: 800 TABLET, FILM COATED ORAL at 18:40

## 2022-03-15 RX ADMIN — ACETAMINOPHEN 1000 MG: 500 TABLET ORAL at 21:39

## 2022-03-15 RX ADMIN — OXYCODONE 5 MG: 5 TABLET ORAL at 15:40

## 2022-03-15 RX ADMIN — OXYCODONE 5 MG: 5 TABLET ORAL at 20:09

## 2022-03-15 ASSESSMENT — PAIN DESCRIPTION - PAIN TYPE
TYPE: ACUTE PAIN;SURGICAL PAIN
TYPE: SURGICAL PAIN
TYPE: ACUTE PAIN;SURGICAL PAIN

## 2022-03-15 ASSESSMENT — EDINBURGH POSTNATAL DEPRESSION SCALE (EPDS)
I HAVE BLAMED MYSELF UNNECESSARILY WHEN THINGS WENT WRONG: NO, NEVER
I HAVE BEEN SO UNHAPPY THAT I HAVE HAD DIFFICULTY SLEEPING: NOT AT ALL
THINGS HAVE BEEN GETTING ON TOP OF ME: NO, I HAVE BEEN COPING AS WELL AS EVER
I HAVE LOOKED FORWARD WITH ENJOYMENT TO THINGS: AS MUCH AS I EVER DID
I HAVE BEEN ANXIOUS OR WORRIED FOR NO GOOD REASON: NO, NOT AT ALL
I HAVE FELT SCARED OR PANICKY FOR NO GOOD REASON: NO, NOT AT ALL
THE THOUGHT OF HARMING MYSELF HAS OCCURRED TO ME: NEVER
I HAVE BEEN SO UNHAPPY THAT I HAVE BEEN CRYING: NO, NEVER
I HAVE FELT SAD OR MISERABLE: NO, NOT AT ALL
I HAVE BEEN ABLE TO LAUGH AND SEE THE FUNNY SIDE OF THINGS: AS MUCH AS I ALWAYS COULD

## 2022-03-15 NOTE — LACTATION NOTE
Initial Consult:    Liss is a  r c/s for suspected uterine rupture at 35+5weeks.  Hx of  delivery at 27+6week with first child.      Pt currently pumping 80/60 at 2min at 25% for 15min, following with 2min hand expression.  Pt is pumping every 3hrs during the day, but does not desire to pump at night.  encouraged pt to do best she can even if there is a 4hr break at night.      Discussed milk making process including supply and demand. Pt understands and states pumping is going well, but does not desire to pump as long as she did with first child as it was taxing on relationship with infant.      Will follow up tomorrow.

## 2022-03-15 NOTE — PROGRESS NOTES
POD 1---  , repair of uterine rupture    UO adequate  + flatus, tolerating clear liquids  Well, denies N/V    LAB:     3/14/2022 10:35 3/14/2022 22:36   WBC 12.9 (H) 10.3   Hemoglobin 15.5 12.5   Hematocrit 43.7 36.0 (L)   Platelets 201 187       PE:     Afebrile  BP normal    Lungs clear to auscultation  Abdomen soft, flat, bowel sounds present and normal  Wound dressing in place, waterproof barrier intact  Calves nontender, Mo sign negative bilaterally  Back:  No CVA tenderness     PLAN: Postop care, advance diet as tolerated, increase activity as tolerated.     EUA: 3 laminaria and 3 sponges removed from cervix/vagina.  Cervix dilated to 1cm.  No adnexal masses palpable b/l.  SONO: Preop - single IUP with +FH.  Postop - thin endometrial stripe; uterus cleared of all products of conception.

## 2022-03-15 NOTE — DISCHARGE PLANNING
Discharge Planning Assessment Post Partum     Reason for Referral: NICU  Address: [facility] [address] [place] [id number]  Phone: [phone]  Type of Living Situation: Lives with FOB  Mom Diagnosis: Pregnancy,   Baby Diagnosis: Hypoxemia, 35w5d, :3/14/2022  Primary Language: English    Name of Baby: Marcello Gan  Father of the Baby: Humphrey Gan  Involved in baby’s care? Yes  Contact Information: [phone]    Prenatal Care: Yes, Dr. MARK Johnson  Mom's PCP: None  PCP for new baby: Dr. Tabor    Support System: FOB and Family  Coping/Bonding between mother & baby: Yes  Source of Feeding:   Supplies for Infant: Yes    Mom's Insurance: AETNA and CIGNA  Baby Covered on Insurance:Yes  Mother Employed/School: Yes  Other children in the home/names & ages: 2 year old, Debbie Metzgar    Financial Hardship/Income: No  Mom's Mental status: Alert and Oriented  Services used prior to admit: None    CPS History: No  Psychiatric History: No  Domestic Violence History: No  Drug/ETOH History: No    Resources Provided: [facility], Postpartum Supportive Resources, Children and Family Resources     Referrals Made: none     Clearance for Discharge: Infant is clear to discharge with MOB and FOB once medically cleared.     Ongoing Plan: LSW will continue to follow up with family for support and resources as needed.

## 2022-03-15 NOTE — PROGRESS NOTES
Milady from blood bank called and confirmed pt is to receive 2 doses rhogam due to presence of FRBC on stain after uterine rupture and C/S.

## 2022-03-15 NOTE — PROGRESS NOTES
Pt received to room 325. Report received from L&D RN. Pt oriented to room, call light, infant security, emergency light, visiting hours and unit routine. Plan of care discussed encouraged to call with needs.

## 2022-03-15 NOTE — PROGRESS NOTES
Bedside report received from sumit RN. 12hr chart check complete, MAR and orders reviewed. Pt in NBN visiting infant under oxygen paul at this time.

## 2022-03-16 PROCEDURE — 770002 HCHG ROOM/CARE - OB PRIVATE (112)

## 2022-03-16 PROCEDURE — 700102 HCHG RX REV CODE 250 W/ 637 OVERRIDE(OP): Performed by: OBSTETRICS & GYNECOLOGY

## 2022-03-16 PROCEDURE — A9270 NON-COVERED ITEM OR SERVICE: HCPCS | Performed by: OBSTETRICS & GYNECOLOGY

## 2022-03-16 RX ADMIN — DOCUSATE SODIUM 100 MG: 100 CAPSULE, LIQUID FILLED ORAL at 22:16

## 2022-03-16 RX ADMIN — OXYCODONE 5 MG: 5 TABLET ORAL at 18:17

## 2022-03-16 RX ADMIN — ACETAMINOPHEN 1000 MG: 500 TABLET ORAL at 14:04

## 2022-03-16 RX ADMIN — OXYCODONE 5 MG: 5 TABLET ORAL at 00:07

## 2022-03-16 RX ADMIN — DOCUSATE SODIUM 100 MG: 100 CAPSULE, LIQUID FILLED ORAL at 00:07

## 2022-03-16 RX ADMIN — ACETAMINOPHEN 1000 MG: 500 TABLET ORAL at 08:43

## 2022-03-16 RX ADMIN — OXYCODONE 5 MG: 5 TABLET ORAL at 22:16

## 2022-03-16 RX ADMIN — OXYCODONE 5 MG: 5 TABLET ORAL at 14:05

## 2022-03-16 RX ADMIN — OXYCODONE HYDROCHLORIDE 10 MG: 10 TABLET ORAL at 08:43

## 2022-03-16 RX ADMIN — IBUPROFEN 800 MG: 800 TABLET, FILM COATED ORAL at 12:34

## 2022-03-16 RX ADMIN — ACETAMINOPHEN 1000 MG: 500 TABLET ORAL at 02:37

## 2022-03-16 RX ADMIN — ACETAMINOPHEN 1000 MG: 500 TABLET ORAL at 22:16

## 2022-03-16 RX ADMIN — OXYCODONE HYDROCHLORIDE 10 MG: 10 TABLET ORAL at 04:07

## 2022-03-16 RX ADMIN — IBUPROFEN 800 MG: 800 TABLET, FILM COATED ORAL at 20:05

## 2022-03-16 RX ADMIN — IBUPROFEN 800 MG: 800 TABLET, FILM COATED ORAL at 02:37

## 2022-03-16 RX ADMIN — PRENATAL WITH FERROUS FUM AND FOLIC ACID 1 TABLET: 3080; 920; 120; 400; 22; 1.84; 3; 20; 10; 1; 12; 200; 27; 25; 2 TABLET ORAL at 08:44

## 2022-03-16 ASSESSMENT — PAIN DESCRIPTION - PAIN TYPE
TYPE: SURGICAL PAIN
TYPE: ACUTE PAIN;SURGICAL PAIN
TYPE: SURGICAL PAIN
TYPE: SURGICAL PAIN
TYPE: ACUTE PAIN;SURGICAL PAIN

## 2022-03-16 NOTE — PROGRESS NOTES
Bedside report received from sumit RN. 12hr chart check complete, MAR and orders reviewed. Pt in NICU at this time visiting infant.

## 2022-03-16 NOTE — PROGRESS NOTES
POD 2---  , repair of uterine rupture     UO adequate  + flatus, tolerating regular diet well, denies N/V    LAB:       3/14/2022 10:35 3/14/2022 22:36   WBC 12.9 (H) 10.3   Hemoglobin 15.5 12.5   Hematocrit 43.7 36.0 (L)   Platelets 201 187     PE:   Afebrile  BP normal    Lungs clear to auscultation  Abdomen soft, flat, bowel sounds present and normal  Wound dressing in place, waterproof barrier intact  Calves nontender, Mo sign negative bilaterally  Back:  No CVA tenderness     PLAN: Postop care, analgesia as needed, diet as tolerated,  activity as tolerated. Patient planning on discharge:  tomorrow (baby still needs ICN) .

## 2022-03-16 NOTE — LACTATION NOTE
Follow up:    MOB continues to use pump q3hr during day hours and was able to pump 1x overnight.  Pump settings unchanged at 80/60 at 2min at 25% for 15min followed by 2min hand expression.  Expressing between 1-5ml per pump session.    MOB understands process of milk making including frequency of breast stimulation and supply and demand.      Infant currently in NICU for respiratory support.     Given pump rental information and how to maximize supply handout.    Will f/u

## 2022-03-16 NOTE — PROGRESS NOTES
Assessment completed WDL. Pt up in room ambulating well. Pt states that pain is well controlled with current pain medications. Plan of care discussed. Pt encouraged to call with needs.

## 2022-03-16 NOTE — CARE PLAN
Problem: Altered Physiologic Condition  Goal: Patient physiologically stable as evidenced by normal lochia, palpable uterine involution and vitals within normal limits  Outcome: Progressing     Problem: Infection - Postpartum  Goal: Postpartum patient will be free of signs and symptoms of infection  Outcome: Progressing     Problem: Bowel Elimination - Post Surgical  Goal: Patient will resume regular bowel sounds and function with no discomfort or distention  Outcome: Progressing     The patient is Stable - Low risk of patient condition declining or worsening    Shift Goals  Clinical Goals: pain control, no s/s infection    Progress made toward(s) clinical / shift goals:  Lochia remains light without clots expressed, pt performing own glenn care without difficulty, supplies provided. No s/s infection noted on assessment, remains afebrile. Pt ambulating and passing gas without difficulty, BS active, tolerating regular diet.    Patient is not progressing towards the following goals: NA

## 2022-03-17 PROCEDURE — 700102 HCHG RX REV CODE 250 W/ 637 OVERRIDE(OP): Performed by: OBSTETRICS & GYNECOLOGY

## 2022-03-17 PROCEDURE — 770002 HCHG ROOM/CARE - OB PRIVATE (112)

## 2022-03-17 PROCEDURE — A9270 NON-COVERED ITEM OR SERVICE: HCPCS | Performed by: OBSTETRICS & GYNECOLOGY

## 2022-03-17 RX ADMIN — OXYCODONE 5 MG: 5 TABLET ORAL at 22:56

## 2022-03-17 RX ADMIN — ACETAMINOPHEN 1000 MG: 500 TABLET ORAL at 22:52

## 2022-03-17 RX ADMIN — OXYCODONE 5 MG: 5 TABLET ORAL at 10:43

## 2022-03-17 RX ADMIN — IBUPROFEN 800 MG: 800 TABLET, FILM COATED ORAL at 04:28

## 2022-03-17 RX ADMIN — ACETAMINOPHEN 1000 MG: 500 TABLET ORAL at 10:43

## 2022-03-17 RX ADMIN — OXYCODONE 5 MG: 5 TABLET ORAL at 18:45

## 2022-03-17 RX ADMIN — OXYCODONE 5 MG: 5 TABLET ORAL at 02:14

## 2022-03-17 RX ADMIN — ACETAMINOPHEN 1000 MG: 500 TABLET ORAL at 16:47

## 2022-03-17 RX ADMIN — OXYCODONE 5 MG: 5 TABLET ORAL at 14:55

## 2022-03-17 RX ADMIN — IBUPROFEN 800 MG: 800 TABLET, FILM COATED ORAL at 12:34

## 2022-03-17 RX ADMIN — OXYCODONE 5 MG: 5 TABLET ORAL at 06:16

## 2022-03-17 RX ADMIN — ACETAMINOPHEN 1000 MG: 500 TABLET ORAL at 04:28

## 2022-03-17 RX ADMIN — IBUPROFEN 800 MG: 800 TABLET, FILM COATED ORAL at 21:26

## 2022-03-17 NOTE — CARE PLAN
The patient is Stable - Low risk of patient condition declining or worsening    Shift Goals  Clinical Goals: VS will remain WNL  Patient Goals: pump and rest  Family Goals: update on poc    Progress made toward(s) clinical / shift goals:  VS WNL    Patient is not progressing towards the following goals: n/a

## 2022-03-17 NOTE — LACTATION NOTE
This note was copied from a baby's chart.  28.5 mm breast pump flange implemented for comfort this afternoon. Liss was experiencing some mild breast engorgement but no redness noted. Settings evaluated and discussed. Larger flanges appear to fit much better.

## 2022-03-17 NOTE — PROGRESS NOTES
POD 3---  , repair of uterine rupture    UO adequate  + flatus, tolerating regular diet well, denies N/V    LAB:       3/14/2022 10:35 3/14/2022 22:36   WBC 12.9 (H) 10.3   Hemoglobin 15.5 12.5   Hematocrit 43.7 36.0 (L)   Platelets 201 187     PE:     Afebrile  BP normal    Lungs clear to auscultation  Abdomen soft, flat, bowel sounds present and normal  Wound dressing in place, waterproof barrier intact  Calves nontender, Mo sign negative bilaterally  Back:  No CVA tenderness     PLAN: Postop care, analgesia as needed, diet as tolerated,  activity as tolerated. Patient planning on discharge:  tomorrow (baby still requiring ICN) .    .

## 2022-03-17 NOTE — PROGRESS NOTES
Report received from KAY Ngo. Patient in bed pumping with reports of pain. Patient requesting pain medications to be given as they become available. Patient breast engorged. Educated mother to massage and apply heat packs before pumping and then apply ice packs after pumping. Whiteboards updated, POC discussed. POB visiting infant in ICN. Call light within reach. Patient encouraged to call with any needs and or concerns.

## 2022-03-18 ENCOUNTER — PHARMACY VISIT (OUTPATIENT)
Dept: PHARMACY | Facility: MEDICAL CENTER | Age: 32
End: 2022-03-18
Payer: COMMERCIAL

## 2022-03-18 VITALS
TEMPERATURE: 97.7 F | RESPIRATION RATE: 16 BRPM | BODY MASS INDEX: 23.7 KG/M2 | HEIGHT: 69 IN | SYSTOLIC BLOOD PRESSURE: 103 MMHG | HEART RATE: 76 BPM | DIASTOLIC BLOOD PRESSURE: 67 MMHG | OXYGEN SATURATION: 96 % | WEIGHT: 160 LBS

## 2022-03-18 PROBLEM — S37.69XA: Status: ACTIVE | Noted: 2022-03-18

## 2022-03-18 PROBLEM — G89.18 POSTOPERATIVE PAIN: Status: ACTIVE | Noted: 2022-03-18

## 2022-03-18 PROCEDURE — 700111 HCHG RX REV CODE 636 W/ 250 OVERRIDE (IP): Performed by: OBSTETRICS & GYNECOLOGY

## 2022-03-18 PROCEDURE — 700102 HCHG RX REV CODE 250 W/ 637 OVERRIDE(OP): Performed by: OBSTETRICS & GYNECOLOGY

## 2022-03-18 PROCEDURE — RXMED WILLOW AMBULATORY MEDICATION CHARGE: Performed by: OBSTETRICS & GYNECOLOGY

## 2022-03-18 PROCEDURE — 3E02340 INTRODUCTION OF INFLUENZA VACCINE INTO MUSCLE, PERCUTANEOUS APPROACH: ICD-10-PCS | Performed by: OBSTETRICS & GYNECOLOGY

## 2022-03-18 PROCEDURE — A9270 NON-COVERED ITEM OR SERVICE: HCPCS | Performed by: OBSTETRICS & GYNECOLOGY

## 2022-03-18 PROCEDURE — 90686 IIV4 VACC NO PRSV 0.5 ML IM: CPT | Performed by: OBSTETRICS & GYNECOLOGY

## 2022-03-18 PROCEDURE — 90471 IMMUNIZATION ADMIN: CPT

## 2022-03-18 RX ORDER — ACETAMINOPHEN 500 MG
500-1000 TABLET ORAL EVERY 6 HOURS PRN
COMMUNITY
Start: 2022-03-18

## 2022-03-18 RX ORDER — IBUPROFEN 200 MG
200-600 TABLET ORAL EVERY 6 HOURS PRN
COMMUNITY
Start: 2022-03-18

## 2022-03-18 RX ORDER — OXYCODONE HYDROCHLORIDE 5 MG/1
2.5-5 TABLET ORAL EVERY 6 HOURS PRN
Qty: 15 TABLET | Refills: 0 | Status: SHIPPED | OUTPATIENT
Start: 2022-03-18 | End: 2022-03-22

## 2022-03-18 RX ORDER — PSEUDOEPHEDRINE HCL 30 MG
100 TABLET ORAL 2 TIMES DAILY PRN
Qty: 60 CAPSULE | COMMUNITY
Start: 2022-03-18

## 2022-03-18 RX ADMIN — PRENATAL WITH FERROUS FUM AND FOLIC ACID 1 TABLET: 3080; 920; 120; 400; 22; 1.84; 3; 20; 10; 1; 12; 200; 27; 25; 2 TABLET ORAL at 07:22

## 2022-03-18 RX ADMIN — ACETAMINOPHEN 1000 MG: 500 TABLET ORAL at 12:37

## 2022-03-18 RX ADMIN — IBUPROFEN 800 MG: 800 TABLET, FILM COATED ORAL at 04:53

## 2022-03-18 RX ADMIN — OXYCODONE 5 MG: 5 TABLET ORAL at 03:12

## 2022-03-18 RX ADMIN — IBUPROFEN 800 MG: 800 TABLET, FILM COATED ORAL at 12:37

## 2022-03-18 RX ADMIN — OXYCODONE 5 MG: 5 TABLET ORAL at 12:36

## 2022-03-18 RX ADMIN — ACETAMINOPHEN 1000 MG: 500 TABLET ORAL at 04:53

## 2022-03-18 RX ADMIN — INFLUENZA A VIRUS A/VICTORIA/2570/2019 IVR-215 (H1N1) ANTIGEN (FORMALDEHYDE INACTIVATED), INFLUENZA A VIRUS A/TASMANIA/503/2020 IVR-221 (H3N2) ANTIGEN (FORMALDEHYDE INACTIVATED), INFLUENZA B VIRUS B/PHUKET/3073/2013 ANTIGEN (FORMALDEHYDE INACTIVATED), AND INFLUENZA B VIRUS B/WASHINGTON/02/2019 ANTIGEN (FORMALDEHYDE INACTIVATED) 0.5 ML: 15; 15; 15; 15 INJECTION, SUSPENSION INTRAMUSCULAR at 12:37

## 2022-03-18 RX ADMIN — OXYCODONE 5 MG: 5 TABLET ORAL at 07:21

## 2022-03-18 ASSESSMENT — PAIN DESCRIPTION - PAIN TYPE: TYPE: ACUTE PAIN;SURGICAL PAIN

## 2022-03-18 NOTE — PROGRESS NOTES
POD 4---  , repair of uterine rupture  UO adequate  + flatus, tolerating regular diet well, denies N/V    LAB:       3/14/2022 10:35 3/14/2022 22:36   WBC 12.9 (H) 10.3   Hemoglobin 15.5 12.5   Hematocrit 43.7 36.0 (L)   Platelets 201 187      PE:     Afebrile  BP normal    Lungs clear to auscultation  Abdomen soft, flat, bowel sounds present and normal  Wound dressing in place, waterproof barrier intact  Calves nontender, Mo sign negative bilaterally  Back:  No CVA tenderness     PLAN: Postop care, analgesia as needed, diet as tolerated,  activity as tolerated. Patient planning on discharge:  today .    Prescriptions:   PNV, OTC analgesics, oxycodone 5 mg #15.  Followup plans:   2 wks .

## 2022-03-18 NOTE — DISCHARGE PLANNING
Meds-to-Beds: Discharge prescription orders listed below delivered to patient's bedside. KAY Mandel notified. Patient's SO Humphrey counseled, elected to have co-payment billed to patient account, and presented valid photo ID.     Current Outpatient Medications   Medication Sig Dispense Refill   • oxyCODONE immediate-release (ROXICODONE) 5 MG Tab Take 0.5-1 Tablet by mouth every 6 hours as needed for Severe Pain for up to 4 days. 15 Tablet 0      Tequila Szymanski, PharmD

## 2022-03-18 NOTE — CARE PLAN
The patient is Stable - Low risk of patient condition declining or worsening    Shift Goals  Clinical Goals: VSS  Patient Goals: Pain Control  Family Goals: Get to visit baby in the NICU    Progress made toward(s) clinical / shift goals:  YES    Patient is not progressing towards the following goals:      Problem: Pain - Standard  Goal: Alleviation of pain or a reduction in pain to the patient’s comfort goal  Outcome: Progressing  Note: Educated the patient of the medications that we have on her MAR, pt states she would like the oxy when it is available.      Problem: Psychosocial - Postpartum  Goal: Patient will verbalize and demonstrate effective bonding and parenting behavior  Outcome: Progressing  Note: Encouraged mother to go down and see her baby in the NICU.

## 2022-03-18 NOTE — DISCHARGE INSTRUCTIONS
PATIENT DISCHARGE EDUCATION INSTRUCTION SHEET  REASONS TO CALL YOUR OBSTETRICIAN  · Persistent fever, shaking, chills (Temperature higher than 100.4) may indicate you have an infection  · Heavy bleeding: soaking more than 1 pad per hour; Passing clots an egg-sized clot or bigger may mean you have an postpartum hemorrhage  · Foul odor from vagina or bad smelling or discolored discharge or blood  · Breast infection (Mastitis symptoms); breast pain, chills, fever, redness or red streaks, may feel flu like symptoms  · Urinary pain, burning or frequency  · Incision that is not healing, increased redness, swelling, tenderness or pain, or any pus from episiotomy or  site may mean you have an infection  · Redness, swelling, warmth, or painful to touch in the calf area of your leg may mean you have a blood clot  · Severe or intensified depression, thoughts or feelings of wanting to hurt yourself or someone else   · Pain in chest, obstructed breathing or shortness of breath (trouble catching your breath) may mean you are having a postpartum complication. Call your provider immediately   · Headache that does not get better, even after taking medicine, a bad headache with vision changes or pain in the upper right area of your belly may mean you have high blood pressure or post birth preeclampsia. Call your provider immediately    HAND WASHING  All family and friends should wash their hands:  · Before and after holding the baby  · Before feeding the baby  · After using the restroom or changing the baby's diaper    WOUND CARE  Ask your physician for additional care instructions. In general:  ·  Incision:  · May shower and pat incision dry   · Keep the incision clean and dry  · There should not be any opening or pus from the incision  · Continue to walk at home 3 times a day   · Do NOT lift anything heavier than your baby (over 10 pounds)  · Encourage family to help participate in care of the  to allow  rest and mom time to heal  · Episiotomy/Laceration  · May use glenn-spray bottle, witch hazel pads and dermaplast spray for comfort  · Use glenn-spray bottle after urinating to cleanse perineal area  · To prevent burning during urination spray glenn-water bottle on labial area   · Pat perineal area dry until episiotomy/laceration is healed  · Continue to use glenn-bottle until bleeding stops as needed  · If have a 2nd degree laceration or greater, a Sitz bath can offer relief from soreness, burning, and inflammation   · Sitz Bath   · Sit in 6 inches of warm water and soak laceration as needed until the laceration heals    VAGINAL CARE AND BLEEDING  · Nothing inside vagina for 6 weeks:   · No sexual intercourse, tampons or douching  · Bleeding may continue for 2-4 weeks. Amount and color may vary  · Soaking 1 pad or more in an hour for several hours is considered heavy bleeding  · Passing large egg sized blood clots can be concerning  · If you feel like you have heavy bleeding or are having increasing amount of blood clots call your Obstetrician immediately  · If you begin feeling faint upon standing, feeling sick to your stomach, have clammy skin, a really fast heartbeat, have chills, start feeling confused, dizzy, sleepy or weak, or feeling like you're going to faint call your Obstetrician immediately    HYPERTENSION   Preeclampsia or gestational hypertension are types of high blood pressure that only pregnant women can get. It is important for you to be aware of symptoms to seek early intervention and treatment. If you have any of these symptoms immediately call your Obstetrician    · Vision changes or blurred vision   · Severe headache or pain that is unrelieved with medication and will not go away  · Persistent pain in upper abdomen or shoulder   · Increased swelling of face, feet, or hands  · Difficulty breathing or shortness of breath at rest  · Urinating less than usual    URINATION AND BOWEL MOVEMENTS  · Eating  "more fiber (bran cereal, fruits, and vegetables) and drinking plenty of fluids will help to avoid constipation  · Urinary frequency and urgency after childbirth is normal  · If you experience any urinary pain, burning or frequency call your provider    BIRTH CONTROL  · It is possible to become pregnant at any time after delivery and while breastfeeding  · Plan to discuss a method of birth control with your physician at your post delivery follow up visit    POSTPARTUM BLUES  During the first few days after birth, you may experience a sense of the \"blues\" which may include impatience, irritability or even crying. These feelings come and go quickly. However, as many as 1 in 10 women experience emotional symptoms known as postpartum depression.     POSTPARTUM DEPRESSION    May start as early as the second or third day after delivery or take several weeks or months to develop. Symptoms of \"blues\" are present, but are more intense: Crying spells; loss of appetite; feelings of hopelessness or loss of control; fear of touching the baby; over concern or no concern at all about the baby; little or no concern about your own appearance/caring for yourself; and/or inability to sleep or excessive sleeping. Contact your Obstetrician if you are experiencing any of these symptoms     PREVENTING SHAKEN BABY  If you are angry or stressed, PUT THE BABY IN THE CRIB, step away, take some deep breaths, and wait until you are calm to care for the baby. DO NOT SHAKE THE BABY. You are not alone, call a supporter for help.  · [facility] 24/7 crisis call line (263-962-2266) or (1-861.549.4042)  · You can also text them, text \"ANSWER\" (624531)      "

## 2022-03-18 NOTE — PROGRESS NOTES
Assessment WNL. Fundus firm, lochia light. Incision covered with silver mepilex, CDI. Voiding independently without difficulty. Self ambulatory in room and halls. Pain controlled with prn medications per mar. Breastfeeding, pumping for infant in ICN. Pain medications administered per mar, will offer when available. POC discussed, pt expresses understanding. Encouraged to call for assistance.

## 2022-03-18 NOTE — PROGRESS NOTES
Patient did well overnight, tolerated fundal massage as to be expected. Baby in NICU overnight, both parents did go and visit the baby. Mother did utilize oxycodone PRN overnight to manage pain. Breastmilk was able to be produced by the mother and was brought to the NICU for baby.

## 2022-03-18 NOTE — PROGRESS NOTES
Discharge instructions and follow up information reviewed with pt, all questions answered. Pt will be discharged home in stable condition with all personal belongings.

## 2022-03-18 NOTE — DISCHARGE SUMMARY
Discharge Summary:      Liss Gan    Admit Date:   3/14/2022  Discharge Date:  3/18/2022     Admitting diagnosis:  Labor and delivery indication for care or intervention [O75.9]  Discharge Diagnosis: Status post  for repeat.  Pregnancy Complications: prior classical Csection, uterine rupture  Tubal Ligation:  no        History:  History reviewed. No pertinent past medical history.  OB History    Para Term  AB Living   2 2   2   2   SAB IAB Ectopic Molar Multiple Live Births           0 2      # Outcome Date GA Lbr Howard/2nd Weight Sex Delivery Anes PTL Lv   2  22 35w5d  2.5 kg (5 lb 8.2 oz) F CS-LTranv Spinal Y RACHAEL   1                  Patient has no known allergies.  Patient Active Problem List    Diagnosis Date Noted   • Uterine rupture, initial encounter 2022   • Postoperative pain 2022   • Labor and delivery indication for care or intervention 2022   • Third trimester bleeding, antepartum 2022   • Abdominal pain affecting pregnancy, antepartum 2022   • Rh negative state in antepartum period, third trimester 2022   • History of classical  section 2022   • History of  delivery, currently pregnant in third trimester 2022   • Indication for care in labor or delivery 2022        Hospital Course:   31 y.o. , now para 2, was admitted with the above mentioned diagnosis, underwent Repeat  for uterine rupture,  for uterine rupture. Patient postpartum course was unremarkable, with progressive advancement in diet , ambulation and toleration of oral analgesia. Patient without complaints today and desires discharge.      Vitals:    22 1827 22 0600 22 1751 22 0600   BP: 103/69 100/64 108/72 103/67   Pulse: 81 92 86 76   Resp:    Temp: 36.8 °C (98.2 °F) 36.4 °C (97.5 °F) 37 °C (98.6 °F) 36.5 °C (97.7 °F)   TempSrc: Temporal Temporal Temporal  Tympanic   SpO2: 96% 98% 96%    Weight:       Height:           Current Facility-Administered Medications   Medication Dose   • influenza vaccine quad (FLUZONE/FLUARIX) injection 0.5 mL  0.5 mL   • oxytocin (PITOCIN) infusion (for postpartum)  125 mL/hr   • oxytocin (PITOCIN) injection 10 Units  10 Units   • miSOPROStol (CYTOTEC) tablet 800 mcg  800 mcg   • methylergonovine (METHERGINE) injection 0.2 mg  0.2 mg   • diphenhydrAMINE (BENADRYL) injection 12.5 mg  12.5 mg    Or   • diphenhydrAMINE (BENADRYL) injection 25 mg  25 mg    Or   • Naloxone HCl (NARCAN) 20 mg in  mL infusion  0.4 mg/hr   • ibuprofen (MOTRIN) tablet 800 mg  800 mg    Followed by   • ibuprofen (MOTRIN) tablet 800 mg  800 mg   • acetaminophen (TYLENOL) tablet 1,000 mg  1,000 mg    Followed by   • acetaminophen (TYLENOL) tablet 1,000 mg  1,000 mg   • oxyCODONE immediate-release (ROXICODONE) tablet 5 mg  5 mg   • oxyCODONE immediate release (ROXICODONE) tablet 10 mg  10 mg   • ondansetron (ZOFRAN) syringe/vial injection 4 mg  4 mg    Or   • ondansetron (ZOFRAN ODT) dispertab 4 mg  4 mg   • diphenhydrAMINE (BENADRYL) tablet/capsule 25 mg  25 mg    Or   • diphenhydrAMINE (BENADRYL) injection 25 mg  25 mg   • docusate sodium (COLACE) capsule 100 mg  100 mg   • tetanus-dipth-acell pertussis (Tdap) inj 0.5 mL  0.5 mL   • measles, mumps and rubella vaccine (MMR) injection 0.5 mL  0.5 mL   • magnesium hydroxide (MILK OF MAGNESIA) suspension 30 mL  30 mL   • prenatal plus vitamin (STUARTNATAL 1+1) 27-1 MG tablet 1 Tablet  1 Tablet       Exam:  Breast Exam: negative  Abdomen: Abdomen soft, non-tender. BS normal. No masses,  No organomegaly  Fundus Non Tender: yes  Incision: dry and intact  Perineum: perineum intact  Extremity: extremities, peripheral pulses and reflexes normal     Labs:         Activity:   Discharge to home  Pelvic Rest x 6 weeks    Assessment:  normal postpartum course  Discharge Assessment: No areas of skin breakdown/redness;  surgical incision intact/healing     Follow up: .Dr. Johnson, 2 weeks   Discharge Meds:   Current Outpatient Medications   Medication Sig Dispense Refill   • acetaminophen (TYLENOL) 500 MG Tab Take 1-2 Tablets by mouth every 6 hours as needed for Mild Pain or Moderate Pain.     • docusate sodium 100 MG Cap Take 100 mg by mouth 2 times a day as needed for Constipation. 60 Capsule    • ibuprofen (MOTRIN) 200 MG Tab Take 1-3 Tablets by mouth every 6 hours as needed for Mild Pain or Moderate Pain. Indications: Joint Damage causing Pain and Loss of Function     • oxyCODONE immediate-release (ROXICODONE) 5 MG Tab Take 0.5-1 Tablets by mouth every 6 hours as needed for Severe Pain for up to 4 days. 15 Tablet 0       Jairo Johnson M.D.

## 2022-03-19 ENCOUNTER — PHARMACY VISIT (OUTPATIENT)
Dept: PHARMACY | Facility: MEDICAL CENTER | Age: 32
End: 2022-03-19

## 2022-03-19 PROCEDURE — RXOTC WILLOW AMBULATORY OTC CHARGE

## (undated) DEVICE — SUTURE 2-0 VICRYL PLUS CT-1 36 (36PK/BX)"

## (undated) DEVICE — SUTURE 4-0 VICRYL PLUSFS-1 - 27 INCH (36/BX)

## (undated) DEVICE — SODIUM CHL IRRIGATION 0.9% 1000ML (12EA/CA)

## (undated) DEVICE — ELECTRODE DUAL RETURN W/ CORD - (50/PK)

## (undated) DEVICE — CATHETER IV NON-SAFETY 18 GA X 1 1/4 (50/BX 4BX/CA)

## (undated) DEVICE — RETRACTOR O C SECTION LRY - (5/BX)

## (undated) DEVICE — DRESSING POST OP BORDER 4 X 10 (5EA/BX)

## (undated) DEVICE — TUBING CLEARLINK DUO-VENT - C-FLO (48EA/CA)

## (undated) DEVICE — GLOVE BIOGEL SZ 8.5 SURGICAL PF LTX - (50PR/BX 4BX/CA)

## (undated) DEVICE — SET EXTENSION WITH 2 PORTS (48EA/CA) ***PART #2C8610 IS A SUBSTITUTE*****

## (undated) DEVICE — PACK C-SECTION (2EA/CA)

## (undated) DEVICE — TRAY SPINAL ANESTHESIA NON-SAFETY (10/CA)

## (undated) DEVICE — SUTURE 3-0 VICRYL PLUS CT-1 - 36 INCH (36/BX)

## (undated) DEVICE — HEAD HOLDER JUNIOR/ADULT

## (undated) DEVICE — WATER IRRIGATION STERILE 1000ML (12EA/CA)

## (undated) DEVICE — SUTURE 0 36IN PDS + VIO CT-1 (36PK/BX)

## (undated) DEVICE — KIT  I.V. START (100EA/CA)

## (undated) DEVICE — SUTURE 0 VICRYL PLUS CT-1 - 36 INCH (36/BX)

## (undated) DEVICE — CLOSURE SKIN STRIP 1/2 X 4 IN - (STERI STRIP) (50/BX 4BX/CA)